# Patient Record
Sex: MALE | Race: BLACK OR AFRICAN AMERICAN | Employment: UNEMPLOYED | URBAN - METROPOLITAN AREA
[De-identification: names, ages, dates, MRNs, and addresses within clinical notes are randomized per-mention and may not be internally consistent; named-entity substitution may affect disease eponyms.]

---

## 2021-05-04 ENCOUNTER — HOSPITAL ENCOUNTER (INPATIENT)
Age: 36
LOS: 7 days | Discharge: HOME | DRG: 751 | End: 2021-05-11
Attending: EMERGENCY MEDICINE | Admitting: PSYCHIATRY & NEUROLOGY
Payer: MEDICAID

## 2021-05-04 DIAGNOSIS — R45.851 DEPRESSION WITH SUICIDAL IDEATION: Primary | ICD-10-CM

## 2021-05-04 DIAGNOSIS — F32.A DEPRESSION WITH SUICIDAL IDEATION: Primary | ICD-10-CM

## 2021-05-04 DIAGNOSIS — R73.9 HYPERGLYCEMIA: ICD-10-CM

## 2021-05-04 PROBLEM — Z86.59 H/O MAJOR DEPRESSION: Status: ACTIVE | Noted: 2021-05-04

## 2021-05-04 LAB
-: ABNORMAL
ABSOLUTE EOS #: 0 K/UL (ref 0–0.4)
ABSOLUTE IMMATURE GRANULOCYTE: ABNORMAL K/UL (ref 0–0.3)
ABSOLUTE LYMPH #: 2.28 K/UL (ref 1–4.8)
ABSOLUTE MONO #: 0.67 K/UL (ref 0.1–1.3)
ACETAMINOPHEN LEVEL: <5 UG/ML (ref 10–30)
AMORPHOUS: ABNORMAL
AMPHETAMINE SCREEN URINE: NEGATIVE
ANION GAP SERPL CALCULATED.3IONS-SCNC: 10 MMOL/L (ref 9–17)
BACTERIA: ABNORMAL
BARBITURATE SCREEN URINE: NEGATIVE
BASOPHILS # BLD: 0 % (ref 0–2)
BASOPHILS ABSOLUTE: 0 K/UL (ref 0–0.2)
BENZODIAZEPINE SCREEN, URINE: NEGATIVE
BILIRUBIN URINE: NEGATIVE
BUN BLDV-MCNC: 10 MG/DL (ref 6–20)
BUN/CREAT BLD: ABNORMAL (ref 9–20)
BUPRENORPHINE URINE: NORMAL
CALCIUM SERPL-MCNC: 9.6 MG/DL (ref 8.6–10.4)
CANNABINOID SCREEN URINE: NEGATIVE
CASTS UA: ABNORMAL /LPF
CHLORIDE BLD-SCNC: 98 MMOL/L (ref 98–107)
CO2: 25 MMOL/L (ref 20–31)
COCAINE METABOLITE, URINE: NEGATIVE
COLOR: YELLOW
COMMENT UA: ABNORMAL
CREAT SERPL-MCNC: 0.74 MG/DL (ref 0.7–1.2)
CRYSTALS, UA: ABNORMAL /HPF
DIFFERENTIAL TYPE: ABNORMAL
EOSINOPHILS RELATIVE PERCENT: 0 % (ref 0–4)
EPITHELIAL CELLS UA: ABNORMAL /HPF
ETHANOL PERCENT: <0.01 %
ETHANOL: <10 MG/DL
GFR AFRICAN AMERICAN: >60 ML/MIN
GFR NON-AFRICAN AMERICAN: >60 ML/MIN
GFR SERPL CREATININE-BSD FRML MDRD: ABNORMAL ML/MIN/{1.73_M2}
GFR SERPL CREATININE-BSD FRML MDRD: ABNORMAL ML/MIN/{1.73_M2}
GLUCOSE BLD-MCNC: 243 MG/DL (ref 75–110)
GLUCOSE BLD-MCNC: 260 MG/DL (ref 75–110)
GLUCOSE BLD-MCNC: 301 MG/DL (ref 70–99)
GLUCOSE BLD-MCNC: 306 MG/DL (ref 75–110)
GLUCOSE BLD-MCNC: 344 MG/DL (ref 75–110)
GLUCOSE URINE: ABNORMAL
HCT VFR BLD CALC: 44.8 % (ref 41–53)
HEMOGLOBIN: 15.1 G/DL (ref 13.5–17.5)
IMMATURE GRANULOCYTES: ABNORMAL %
KETONES, URINE: ABNORMAL
LEUKOCYTE ESTERASE, URINE: NEGATIVE
LYMPHOCYTES # BLD: 24 % (ref 24–44)
MCH RBC QN AUTO: 30.9 PG (ref 26–34)
MCHC RBC AUTO-ENTMCNC: 33.7 G/DL (ref 31–37)
MCV RBC AUTO: 91.7 FL (ref 80–100)
MDMA URINE: NORMAL
METHADONE SCREEN, URINE: NEGATIVE
METHAMPHETAMINE, URINE: NORMAL
MONOCYTES # BLD: 7 % (ref 1–7)
MORPHOLOGY: ABNORMAL
MUCUS: ABNORMAL
NITRITE, URINE: NEGATIVE
NRBC AUTOMATED: ABNORMAL PER 100 WBC
OPIATES, URINE: NEGATIVE
OTHER OBSERVATIONS UA: ABNORMAL
OXYCODONE SCREEN URINE: NEGATIVE
PDW BLD-RTO: 13.3 % (ref 11.5–14.9)
PH UA: 6.5 (ref 5–8)
PHENCYCLIDINE, URINE: NEGATIVE
PLATELET # BLD: 199 K/UL (ref 150–450)
PLATELET ESTIMATE: ABNORMAL
PMV BLD AUTO: 10.1 FL (ref 6–12)
POTASSIUM SERPL-SCNC: 4.3 MMOL/L (ref 3.7–5.3)
PROPOXYPHENE, URINE: NORMAL
PROTEIN UA: NEGATIVE
RBC # BLD: 4.88 M/UL (ref 4.5–5.9)
RBC # BLD: ABNORMAL 10*6/UL
RBC UA: ABNORMAL /HPF
RENAL EPITHELIAL, UA: ABNORMAL /HPF
SALICYLATE LEVEL: <1 MG/DL (ref 3–10)
SARS-COV-2, RAPID: NOT DETECTED
SEG NEUTROPHILS: 69 % (ref 36–66)
SEGMENTED NEUTROPHILS ABSOLUTE COUNT: 6.55 K/UL (ref 1.3–9.1)
SODIUM BLD-SCNC: 133 MMOL/L (ref 135–144)
SPECIFIC GRAVITY UA: 1.04 (ref 1–1.03)
SPECIMEN DESCRIPTION: NORMAL
TEST INFORMATION: NORMAL
TOXIC TRICYCLIC SC,BLOOD: ABNORMAL
TRICHOMONAS: ABNORMAL
TRICYCLIC ANTIDEP,URINE: NEGATIVE
TRICYCLIC ANTIDEPRESSANTS, UR: NORMAL
TURBIDITY: CLEAR
URINE HGB: ABNORMAL
UROBILINOGEN, URINE: NORMAL
WBC # BLD: 9.5 K/UL (ref 3.5–11)
WBC # BLD: ABNORMAL 10*3/UL
WBC UA: ABNORMAL /HPF
YEAST: ABNORMAL

## 2021-05-04 PROCEDURE — 81001 URINALYSIS AUTO W/SCOPE: CPT

## 2021-05-04 PROCEDURE — 83036 HEMOGLOBIN GLYCOSYLATED A1C: CPT

## 2021-05-04 PROCEDURE — 87635 SARS-COV-2 COVID-19 AMP PRB: CPT

## 2021-05-04 PROCEDURE — 82947 ASSAY GLUCOSE BLOOD QUANT: CPT

## 2021-05-04 PROCEDURE — G0480 DRUG TEST DEF 1-7 CLASSES: HCPCS

## 2021-05-04 PROCEDURE — 36415 COLL VENOUS BLD VENIPUNCTURE: CPT

## 2021-05-04 PROCEDURE — 6370000000 HC RX 637 (ALT 250 FOR IP): Performed by: EMERGENCY MEDICINE

## 2021-05-04 PROCEDURE — 85025 COMPLETE CBC W/AUTO DIFF WBC: CPT

## 2021-05-04 PROCEDURE — 80048 BASIC METABOLIC PNL TOTAL CA: CPT

## 2021-05-04 PROCEDURE — 80143 DRUG ASSAY ACETAMINOPHEN: CPT

## 2021-05-04 PROCEDURE — 6370000000 HC RX 637 (ALT 250 FOR IP): Performed by: PSYCHIATRY & NEUROLOGY

## 2021-05-04 PROCEDURE — 99285 EMERGENCY DEPT VISIT HI MDM: CPT

## 2021-05-04 PROCEDURE — 80307 DRUG TEST PRSMV CHEM ANLYZR: CPT

## 2021-05-04 PROCEDURE — 80179 DRUG ASSAY SALICYLATE: CPT

## 2021-05-04 PROCEDURE — 1240000000 HC EMOTIONAL WELLNESS R&B

## 2021-05-04 PROCEDURE — 6370000000 HC RX 637 (ALT 250 FOR IP): Performed by: NURSE PRACTITIONER

## 2021-05-04 RX ORDER — PALIPERIDONE 6 MG/1
6 TABLET, EXTENDED RELEASE ORAL 2 TIMES DAILY
Status: ON HOLD | COMMUNITY
End: 2021-05-11 | Stop reason: HOSPADM

## 2021-05-04 RX ORDER — LORAZEPAM 1 MG/1
1 TABLET ORAL ONCE
Status: COMPLETED | OUTPATIENT
Start: 2021-05-04 | End: 2021-05-04

## 2021-05-04 RX ORDER — POLYETHYLENE GLYCOL 3350 17 G/17G
17 POWDER, FOR SOLUTION ORAL DAILY PRN
Status: DISCONTINUED | OUTPATIENT
Start: 2021-05-04 | End: 2021-05-11 | Stop reason: HOSPADM

## 2021-05-04 RX ORDER — PREGABALIN 100 MG/1
100 CAPSULE ORAL 3 TIMES DAILY
Status: ON HOLD | COMMUNITY
End: 2021-05-11 | Stop reason: HOSPADM

## 2021-05-04 RX ORDER — INSULIN GLARGINE 100 [IU]/ML
55 INJECTION, SOLUTION SUBCUTANEOUS NIGHTLY
Status: DISCONTINUED | OUTPATIENT
Start: 2021-05-04 | End: 2021-05-05

## 2021-05-04 RX ORDER — INSULIN LISPRO 100 [IU]/ML
40 INJECTION, SOLUTION INTRAVENOUS; SUBCUTANEOUS
COMMUNITY

## 2021-05-04 RX ORDER — QUETIAPINE FUMARATE 200 MG/1
200 TABLET, FILM COATED ORAL NIGHTLY
Status: ON HOLD | COMMUNITY
End: 2021-05-11 | Stop reason: HOSPADM

## 2021-05-04 RX ORDER — NICOTINE 21 MG/24HR
1 PATCH, TRANSDERMAL 24 HOURS TRANSDERMAL DAILY
Status: DISCONTINUED | OUTPATIENT
Start: 2021-05-04 | End: 2021-05-11 | Stop reason: HOSPADM

## 2021-05-04 RX ORDER — DEXTROSE MONOHYDRATE 25 G/50ML
12.5 INJECTION, SOLUTION INTRAVENOUS PRN
Status: DISCONTINUED | OUTPATIENT
Start: 2021-05-04 | End: 2021-05-11 | Stop reason: HOSPADM

## 2021-05-04 RX ORDER — DEXTROSE MONOHYDRATE 50 MG/ML
100 INJECTION, SOLUTION INTRAVENOUS PRN
Status: DISCONTINUED | OUTPATIENT
Start: 2021-05-04 | End: 2021-05-11 | Stop reason: HOSPADM

## 2021-05-04 RX ORDER — MAGNESIUM HYDROXIDE/ALUMINUM HYDROXICE/SIMETHICONE 120; 1200; 1200 MG/30ML; MG/30ML; MG/30ML
30 SUSPENSION ORAL EVERY 6 HOURS PRN
Status: DISCONTINUED | OUTPATIENT
Start: 2021-05-04 | End: 2021-05-11 | Stop reason: HOSPADM

## 2021-05-04 RX ORDER — NICOTINE POLACRILEX 4 MG
15 LOZENGE BUCCAL PRN
Status: DISCONTINUED | OUTPATIENT
Start: 2021-05-04 | End: 2021-05-11 | Stop reason: HOSPADM

## 2021-05-04 RX ORDER — HYDROXYZINE PAMOATE 50 MG/1
50 CAPSULE ORAL 3 TIMES DAILY PRN
Status: ON HOLD | COMMUNITY
End: 2021-05-11 | Stop reason: HOSPADM

## 2021-05-04 RX ORDER — TRAZODONE HYDROCHLORIDE 50 MG/1
50 TABLET ORAL NIGHTLY PRN
Status: DISCONTINUED | OUTPATIENT
Start: 2021-05-04 | End: 2021-05-06

## 2021-05-04 RX ORDER — INSULIN GLARGINE 100 [IU]/ML
55 INJECTION, SOLUTION SUBCUTANEOUS NIGHTLY
COMMUNITY

## 2021-05-04 RX ORDER — IBUPROFEN 400 MG/1
400 TABLET ORAL EVERY 6 HOURS PRN
Status: DISCONTINUED | OUTPATIENT
Start: 2021-05-04 | End: 2021-05-11 | Stop reason: HOSPADM

## 2021-05-04 RX ORDER — HYDROXYZINE 50 MG/1
50 TABLET, FILM COATED ORAL 3 TIMES DAILY PRN
Status: DISCONTINUED | OUTPATIENT
Start: 2021-05-04 | End: 2021-05-11 | Stop reason: HOSPADM

## 2021-05-04 RX ORDER — ACETAMINOPHEN 325 MG/1
650 TABLET ORAL EVERY 4 HOURS PRN
Status: DISCONTINUED | OUTPATIENT
Start: 2021-05-04 | End: 2021-05-11 | Stop reason: HOSPADM

## 2021-05-04 RX ADMIN — INSULIN GLARGINE 55 UNITS: 100 INJECTION, SOLUTION SUBCUTANEOUS at 20:54

## 2021-05-04 RX ADMIN — INSULIN LISPRO 4 UNITS: 100 INJECTION, SOLUTION INTRAVENOUS; SUBCUTANEOUS at 20:55

## 2021-05-04 RX ADMIN — LORAZEPAM 1 MG: 1 TABLET ORAL at 15:40

## 2021-05-04 ASSESSMENT — SLEEP AND FATIGUE QUESTIONNAIRES
AVERAGE NUMBER OF SLEEP HOURS: 2
RESTFUL SLEEP: NO
DIFFICULTY ARISING: NO
DIFFICULTY STAYING ASLEEP: YES
SLEEP PATTERN: DIFFICULTY FALLING ASLEEP;RESTLESSNESS;DIFFICULTY ARISING;DISTURBED/INTERRUPTED SLEEP
DO YOU HAVE DIFFICULTY SLEEPING: YES
DIFFICULTY FALLING ASLEEP: YES

## 2021-05-04 NOTE — PROGRESS NOTES
Medication History completed: All medications added this encounter. Medications confirmed with [facility] and [address] [place] [address]. The patient states he has been off all medications for about a week. He states he used Adderall without a prescription about a week ago.      Thank you,  Rosalva Alexandra, PharmD, BCPS  [phone]

## 2021-05-04 NOTE — ED NOTES
This note will not be viewable in MyChart for the following reason(s). This is a Psychotherapy Note. Provisional Diagnosis:   Schizoaffective Disorder     Psychosocial and Contextual Factors: [facility] patient was observed attempting to walk in front of traffic. C-SSRS Summary:      Patient:X   Family:   Agency:     Substance Abuse: Patient denies. Present Suicidal Behavior:  Patient was reportedly attempting to run in front of traffic prior to arrival. Patient reports current suicidal thoughts ith plan to run in front of traffic. Verbal: X    Attempt:X    Past Suicidal Behavior: Patient reports a past suicide attempt \"a couple of years ago. \" but does not remember how. Verbal:    Attempt:X      Self-Injurious/Self-Mutilation:Patient denies. Trauma Identified:  Patient denies. Risk Factors:    Patient reports no support system. Patient is homeless. Patient has no income. Clinical Summary:    Geraldine Babin is a 38 y.o. male who presents to the ED by [facility] department after patient abandoned his car and attempting to walk in front of traffic. Patient reports auditory command hallucinations telling him to kill and hurt himself. Patient reports suicidal ideation for the last month and states the thoughts have been getting worse in frequency and during. Patient reports current suicidal ideation with plan and intent to run in front of traffic. Patient states his auditory hallucinations are the main reason behind feeling depressed and suicidal.        Patient is withdrawn while in the ED. Patient states he is homeless and has no support system. Patient states he is not linked with outpatient mental health services but states he ran out of his psychotropic medications about a week ago. Patient reports poor sleep. Patient denies visual hallucinations. Patient states he has been admitted psychiatrically multiple times before.  Patient denies homicidal ideation. Level of Care Disposition:    Writer consulted with Dr. Farhana Maza who recommends inpatient psychiatric admission for safety and stabilization. Patient is in agreement and signed application for voluntary admission.

## 2021-05-04 NOTE — ED NOTES
Writer got off the phone with [facility] for "[employer] in [place]. Staff states they can not move further with the case until a [place] petition to [place] [address] is completed. Patient ED physician is not licensed in [place] and can not complete petition to [place] [address]. Writer to call [facility] for admission to [facility].

## 2021-05-04 NOTE — BH NOTE
.Patient given tobacco quitline number 16673471598 at this time, refusing to call at this time, states \" I just dont want to quit now\"- patient given information as to the dangers of long term tobacco use. Continue to reinforce the importance of tobacco cessation.

## 2021-05-04 NOTE — ED NOTES
Duplicate, see previous TE. Writer spoke with the [facility] psychiatry who states patient they can't admit patient as he patient needs to be authorized through the [facility] he is from. Writer spoke with [facility] ([phone]) Who states patient has not received services in over 3 years. SARAH states they need a packet faxed to [phone]. Writer faxed [facility] patient information, Patient does not have ID.

## 2021-05-04 NOTE — BH NOTE
`[facility] [place]  Admission Note     Admission Type:   Admission Type: Voluntary    Reason for admission:  Reason for Admission: depression with suicidal ideation    PATIENT STRENGTHS:  Strengths: No significant Physical Illness    Patient Strengths and Limitations:  Limitations: Hopeless about future    Addictive Behavior:   Addictive Behavior  In the past 3 months, have you felt or has someone told you that you have a problem with:  : None  Do you have a history of Chemical Use?: No  Do you have a history of Alcohol Use?: No  Do you have a history of Street Drug Abuse?: No  Histroy of Prescripton Drug Abuse?: No    Medical Problems:   Past Medical History:   Diagnosis Date    [facility] ([facility]     [facility]        Status EXAM:  Status and Exam  Normal: No  Facial Expression: Avoids Gaze, Flat  Affect: Blunt  Level of Consciousness: Alert  Mood:Normal: No  Mood: Depressed, Anxious  Motor Activity:Normal: No  Motor Activity: Decreased  Interview Behavior: Evasive  Preception: [place] to Person, [place] to Time, [place] to Place, [place] to Situation  Attention:Normal: No  Attention: Unable to Concentrate  Thought Processes: Blocking  Thought Content:Normal: No  Thought Content: Preoccupations  Hallucinations:  Auditory (Comment)(\"not nice things\")  Delusions: No  Memory:Normal: No  Memory: Poor Recent, Poor Remote  Insight and Judgment: No  Insight and Judgment: Poor Judgment, Poor Insight, Unmotivated  Present Suicidal Ideation: No  Present Homicidal Ideation: No    Tobacco Screening:  Practical Counseling, on admission, andie X, if applicable and completed (first 3 are required if patient doesn't refuse):            ( )  Recognizing danger situations (included triggers and roadblocks)                    ( )  Coping skills (new ways to manage stress, exercise, relaxation techniques, changing routine, distraction)                                                           ( )  Basic information about quitting (benefits of quitting, techniques in how to quit, available resources  ( ) Referral for counseling faxed to [facility]                                           (x ) Patient refused counseling  ( ) Patient has not smoked in the last 30 days    Metabolic Screening:    No results found for: LABA1C    No results found for: CHOL  No results found for: TRIG  No results found for: HDL  No components found for: LDLCAL  No results found for: LABVLDL      Body mass index is 31 kg/m². BP Readings from Last 2 Encounters:   05/04/21 128/74           Pt admitted with followings belongings:  Lucrecia Bullion: [employer]  Clothing: Footwear, Pants, Shirt, Socks     Valuables sent home with N/A. Valuables placed in safe in security envelope, number:  [id number]. Patient's home medications were reviewed. Patient oriented to surroundings and program expectations and copy of patient rights given. Received admission packet:  yes. Consents reviewed, signed voluntary Refused all other consents. Patient verbalize understanding:  yes. Patient education on precautions: yes    Patient admitted to the unit voluntary from the [facility] brought in by [facility] after abandoning car on highway and walking into traffic. Patient is homeless and off medications for more than one week. Patient states he is hearing voices saying \"not nice things. \"  On admission to the unit patient states he is tired and wants to go sleep. Declined signing admission papers. Patient denies drug and alcohol use. Reports poor sleep.                    Blanka Dobbins RN

## 2021-05-04 NOTE — ED PROVIDER NOTES
[address] ED  eMERGENCY dEPARTMENT eNCOUnter      Pt Name: Camila Brewer  MRN: [id number]  YOB: 1985  Date of evaluation: 5/4/21  PCP: No primary care provider on file. CHIEF COMPLAINT:   Chief Complaint   Patient presents with    Suicidal     HISTORY OF PRESENT ILLNESS    Camila Brweer is a 38 y.o. male who presents with a chief complaint of suicidal ideations. Patient states that he is hearing voices over the past week. They are telling him to hurt himself. He apparently was found by police running into traffic today. States he was not hit or anything. He denies any thoughts of hurting anyone else. He states that he has not been taking any of his medications including his diabetic medications over the past week. No recent fevers, chills other illnesses. No nausea or vomiting or changes in bowel or bladder habits. He is supposed to be on insulin for his diabetes. Symptoms are acute. Symptoms are moderate. Nothing make symptoms better or worse. Patient has no other complaints at this time. REVIEW OF SYSTEMS       Constitutional: Denies recent fever, chills. Neck: No neck pain. Respiratory: Denies recent shortness of breath. Cardiac:  Denies recent chest pain. GI: Denies any recent abdominal pain nausea or vomiting. : Denies dysuria. Musculoskeletal: Denies focal weakness. Neurologic: Denies headache or focal weakness. Skin:  Denies any rash. Psychiatric: Positive for auditory hallucinations, suicidal ideations    Negative in 10 essential Systems except as mentioned above and in the HPI. PAST MEDICAL HISTORY   PMH:  has a past medical history of Diabetes mellitus ([facility] and Schizophrenia ([facility] Diabetes  Surgical History:  has no past surgical history on file. Noncontributory  Social History:  reports that he has never smoked. He does not have any smokeless tobacco history on file. He reports previous alcohol use.  He reports previous drug use.Denies drug or alcohol use  Family History: Noncontributory at this time  Psychiatric History: See PMH  Allergies:has No Known Allergies. PHYSICAL EXAM     INITIAL VITALS: BP: (!) 117/56  Pulse: 68  Resp: 16  Temp: 97.9 °F (36.6 °C) SpO2: 98 %     Constitutional:  Well developed, no acute distress   Eyes:  Pupils equal and readily reactive to light  HENT:  Atraumatic, external ears normal, nose normal, oropharynx moist. Neck- supple    Respiratory:  Clear to auscultation bilaterally with good air exchange  Cardiovascular:  RRR with normal S1 and S2  GI:  Soft, nondistended and nontender   Musculoskeletal:  No edema, no tenderness, no deformities  Integument:  No rash  Neurologic:  Alert & oriented x 4, no focal deficits noted   Psychiatric: Flat affect      EMERGENCY DEPARTMENT COURSE     25-year-old male presents with suicidal ideations and attempting to run into traffic. Currently is afebrile, nontoxic, normal vital signs. No acute distress. Blood sugar over 300 per EMS. He is a known diabetic noncompliant with his insulin. We will get blood work here, speak with on-call psychiatry. Patient has never been [place] and does acknowledge a psychiatric history. Patient is medically clear for psychiatric evaluation and admission. FINAL IMPRESSION     1. Depression with suicidal ideation    2. Hyperglycemia          DISPOSITION:  DISPOSITION          PATIENT REFERRED TO:  No follow-up provider specified. DISCHARGE MEDICATIONS:  New Prescriptions    No medications on file       (Please note that portions of this note were completed with a voice recognition program. Efforts were made to edit the dictations but occasionally words are mis-transcribed.  Whenever words are used in this note in any gender, they shall be construed as though they were used in the gender appropriate to the circumstances; and whenever words are used in this note in the singular or plural form, they shall be construed as though they were used in the form appropriate to the circumstances.)    Lani Ruiz DO  Attending Emergency Medicine Physician        Lani Ruiz DO  05/04/21 [id number]

## 2021-05-05 ENCOUNTER — APPOINTMENT (OUTPATIENT)
Dept: ULTRASOUND IMAGING | Age: 36
DRG: 751 | End: 2021-05-05
Payer: MEDICAID

## 2021-05-05 PROBLEM — F33.3 MDD (MAJOR DEPRESSIVE DISORDER), RECURRENT, SEVERE, WITH PSYCHOSIS (HCC): Status: ACTIVE | Noted: 2021-05-05

## 2021-05-05 PROBLEM — F20.0 PARANOID SCHIZOPHRENIA (HCC): Status: ACTIVE | Noted: 2021-05-05

## 2021-05-05 LAB
ESTIMATED AVERAGE GLUCOSE: 269 MG/DL
GLUCOSE BLD-MCNC: 199 MG/DL (ref 75–110)
GLUCOSE BLD-MCNC: 267 MG/DL (ref 75–110)
GLUCOSE BLD-MCNC: 267 MG/DL (ref 75–110)
GLUCOSE BLD-MCNC: 273 MG/DL (ref 75–110)
HBA1C MFR BLD: 11 % (ref 4–6)

## 2021-05-05 PROCEDURE — 6370000000 HC RX 637 (ALT 250 FOR IP): Performed by: NURSE PRACTITIONER

## 2021-05-05 PROCEDURE — 1240000000 HC EMOTIONAL WELLNESS R&B

## 2021-05-05 PROCEDURE — 6370000000 HC RX 637 (ALT 250 FOR IP): Performed by: PSYCHIATRY & NEUROLOGY

## 2021-05-05 PROCEDURE — 90792 PSYCH DIAG EVAL W/MED SRVCS: CPT | Performed by: PSYCHIATRY & NEUROLOGY

## 2021-05-05 PROCEDURE — 6370000000 HC RX 637 (ALT 250 FOR IP): Performed by: INTERNAL MEDICINE

## 2021-05-05 PROCEDURE — 99222 1ST HOSP IP/OBS MODERATE 55: CPT | Performed by: INTERNAL MEDICINE

## 2021-05-05 PROCEDURE — 76770 US EXAM ABDO BACK WALL COMP: CPT

## 2021-05-05 PROCEDURE — 82947 ASSAY GLUCOSE BLOOD QUANT: CPT

## 2021-05-05 RX ORDER — OLANZAPINE 5 MG/1
5 TABLET ORAL NIGHTLY
Status: DISCONTINUED | OUTPATIENT
Start: 2021-05-05 | End: 2021-05-07

## 2021-05-05 RX ORDER — INSULIN GLARGINE 100 [IU]/ML
25 INJECTION, SOLUTION SUBCUTANEOUS 2 TIMES DAILY
Status: DISCONTINUED | OUTPATIENT
Start: 2021-05-05 | End: 2021-05-06

## 2021-05-05 RX ADMIN — INSULIN GLARGINE 25 UNITS: 100 INJECTION, SOLUTION SUBCUTANEOUS at 09:34

## 2021-05-05 RX ADMIN — INSULIN LISPRO 6 UNITS: 100 INJECTION, SOLUTION INTRAVENOUS; SUBCUTANEOUS at 09:31

## 2021-05-05 RX ADMIN — HYDROXYZINE HYDROCHLORIDE 50 MG: 50 TABLET, FILM COATED ORAL at 20:25

## 2021-05-05 RX ADMIN — INSULIN LISPRO 2 UNITS: 100 INJECTION, SOLUTION INTRAVENOUS; SUBCUTANEOUS at 13:08

## 2021-05-05 RX ADMIN — INSULIN LISPRO 3 UNITS: 100 INJECTION, SOLUTION INTRAVENOUS; SUBCUTANEOUS at 20:24

## 2021-05-05 RX ADMIN — TRAZODONE HYDROCHLORIDE 50 MG: 50 TABLET ORAL at 20:25

## 2021-05-05 RX ADMIN — INSULIN LISPRO 6 UNITS: 100 INJECTION, SOLUTION INTRAVENOUS; SUBCUTANEOUS at 17:57

## 2021-05-05 RX ADMIN — INSULIN GLARGINE 25 UNITS: 100 INJECTION, SOLUTION SUBCUTANEOUS at 20:24

## 2021-05-05 RX ADMIN — OLANZAPINE 5 MG: 5 TABLET, FILM COATED ORAL at 20:25

## 2021-05-05 NOTE — CARE COORDINATION
BHI Biopsychosocial Assessment    Current Level of Psychosocial Functioning     Independent: xx  Dependent:    Minimal Assist:      Psychosocial High Risk Factors (check all that apply)    Unable to obtain meds:    Chronic illness/pain:     Substance abuse: xx  Lack of Family Support:    Financial stress:    Isolation:    Inadequate Community Resources:    Suicide attempt(s):    Not taking medications:     Victim of crime:    Developmental Delay:    Unable to manage personal needs:    Age 72 or older:    Homeless:    No transportation:    Readmission within 30 days:    Unemployment:    Traumatic Event:      Psychiatric Advanced Directives: None reported    Family to Involve in Treatment: Patient states he does not wish to involve family in treatment    Sexual Orientation: DAMIÁN    Patient Strengths: Communication skills, no significant physical illnesses    Patient Barriers: substance use, lack of supports    Opiate Education: patient denies substance use    CMHC/mental health history: Patient reports he was linked to [facility] in [facility] but stopped going and stopped medications, patient reports having a diagnosis of schizophrenia    Plan of Care   medication management, group/individual therapies, family meetings, psycho -education, treatment team meetings to assist with stabilization    Initial Discharge Plan: Patient to stabilize mood, return to [facility] for outpatient services      Clinical Summary:      Pt is a 38year old male who presented to the [facility] with reported suicidal ideations. Patient reports he lives near [facility], got in his car to \"drive until I ran out of gas and kill myself. \" Patient reports he needs to get back on medications, states he was seeing a doctor in [facility] but stopped going to appointments and stopped medications. Patient is unsure of the name of the agency. Patient reports he was living with his girlfriend, kids, and sister but states he cannot return there at discharge.  Patient declined to offer more information on his relationship issues. Patient reports previous inpatient hospitalizations. Patient reports auditory hallucinations are quieter today than they have been recently, denies visual hallucinations. Patient reports fleeting suicidal ideations but contracts for safety on unit. Social work will continue to engage patient in treatment and discharge planning.

## 2021-05-05 NOTE — GROUP NOTE
Group Therapy Note    Date: 5/5/2021    Group Start Time: 1430  Group End Time: 1204  Group Topic: Cognitive Skills    [facility]    [place], [place]        Group Therapy Note    Attendees: 8/14         Pt did not attend RT skills group d/t resting in room despite staff invitation to attend. 1:1 talk time offered as alternative to group session, pt declined.

## 2021-05-05 NOTE — CONSULTS
pen Inject 55 Units into the skin nightly   Yes Historical Provider, MD   pregabalin (LYRICA) 100 MG capsule Take 100 mg by mouth 3 times daily. Yes Historical Provider, MD        Allergies:     Patient has no known allergies. Social History:     Tobacco:    reports that he has never smoked. He does not have any smokeless tobacco history on file. Alcohol:      reports previous alcohol use. Drug Use:  reports previous drug use. Family History:     History reviewed. No pertinent family history. Review of Systems:     Positive and Negative as described in HPI. CONSTITUTIONAL:  negative for fevers, chills, sweats, fatigue, weight loss  HEENT:  negative for vision, hearing changes, runny nose, throat pain  RESPIRATORY:  negative for shortness of breath, cough, congestion, wheezing. CARDIOVASCULAR:  negative for chest pain, palpitations.   GASTROINTESTINAL:  negative for nausea, vomiting, diarrhea, constipation, change in bowel habits, abdominal pain   GENITOURINARY:  negative for difficulty of urination, burning with urination, frequency   INTEGUMENT:  negative for rash, skin lesions, easy bruising   HEMATOLOGIC/LYMPHATIC:  negative for swelling/edema   ALLERGIC/IMMUNOLOGIC:  negative for urticaria , itching  ENDOCRINE:  negative increase in drinking, increase in urination, hot or cold intolerance  MUSCULOSKELETAL:  negative joint pains, muscle aches, swelling of joints  NEUROLOGICAL:  negative for headaches, dizziness, lightheadedness, numbness, pain, tingling extremities      Physical Exam:     BP (!) 108/53   Pulse 82   Temp 98.2 °F (36.8 °C) (Oral)   Resp 14   Ht 6' 1\" (1.854 m)   Wt 235 lb (106.6 kg)   SpO2 98%   BMI 31.00 kg/m²   Temp (24hrs), Av.2 °F (36.8 °C), Min:97.9 °F (36.6 °C), Max:98.7 °F (37.1 °C)    Recent Labs     21  1655 21  1824 21  0912   POCGLU 243* 344* 306* 267*     No intake or output data in the 24 hours ending 21 1026    General Appearance:  alert, well appearing, and in no acute distress  Mental status: oriented to person, place, and time with normal affect  Head:  normocephalic, atraumatic. Eye: no icterus, redness, pupils equal and reactive, extraocular eye movements intact, conjunctiva clear  Ear: normal external ear, no discharge, hearing intact  Nose:  no drainage noted  Mouth: mucous membranes moist  Neck: supple, no carotid bruits, thyroid not palpable  Lungs: Bilateral equal air entry, clear to ausculation, no wheezing, rales or rhonchi, normal effort  Cardiovascular: normal rate, regular rhythm, no murmur, gallop, rub.   Abdomen: Soft, nontender, nondistended, normal bowel sounds, no hepatomegaly or splenomegaly  Neurologic: There are no new focal motor or sensory deficits, normal muscle tone and bulk, no abnormal sensation, normal speech, cranial nerves II through XII grossly intact  Skin: No gross lesions, rashes, bruising or bleeding on exposed skin area  Extremities:  peripheral pulses palpable, no pedal edema or calf pain with palpation      Investigations:      Laboratory Testing:  Recent Results (from the past 24 hour(s))   CBC Auto Differential    Collection Time: 05/04/21  1:40 PM   Result Value Ref Range    WBC 9.5 3.5 - 11.0 k/uL    RBC 4.88 4.5 - 5.9 m/uL    Hemoglobin 15.1 13.5 - 17.5 g/dL    Hematocrit 44.8 41 - 53 %    MCV 91.7 80 - 100 fL    MCH 30.9 26 - 34 pg    MCHC 33.7 31 - 37 g/dL    RDW 13.3 11.5 - 14.9 %    Platelets 404 944 - 168 k/uL    MPV 10.1 6.0 - 12.0 fL    NRBC Automated NOT REPORTED per 100 WBC    Differential Type NOT REPORTED     Immature Granulocytes NOT REPORTED 0 %    Absolute Immature Granulocyte NOT REPORTED 0.00 - 0.30 k/uL    WBC Morphology NOT REPORTED     RBC Morphology NOT REPORTED     Platelet Estimate NOT REPORTED     Seg Neutrophils 69 (H) 36 - 66 %    Lymphocytes 24 24 - 44 %    Monocytes 7 1 - 7 %    Eosinophils % 0 0 - 4 %    Basophils 0 0 - 2 %    Segs Absolute 6.55 1.3 - 9.1 k/uL    Absolute Lymph # 2.28 1.0 - 4.8 k/uL    Absolute Mono # 0.67 0.1 - 1.3 k/uL    Absolute Eos # 0.00 0.0 - 0.4 k/uL    Basophils Absolute 0.00 0.0 - 0.2 k/uL    Morphology FEW GIANT PLATELETS    Basic Metabolic Panel    Collection Time: 05/04/21  1:40 PM   Result Value Ref Range    Glucose 301 (H) 70 - 99 mg/dL    BUN 10 6 - 20 mg/dL    CREATININE 0.74 0.70 - 1.20 mg/dL    Bun/Cre Ratio NOT REPORTED 9 - 20    Calcium 9.6 8.6 - 10.4 mg/dL    Sodium 133 (L) 135 - 144 mmol/L    Potassium 4.3 3.7 - 5.3 mmol/L    Chloride 98 98 - 107 mmol/L    CO2 25 20 - 31 mmol/L    Anion Gap 10 9 - 17 mmol/L    GFR Non-African American >60 >60 mL/min    GFR African American >60 >60 mL/min    GFR Comment          GFR Staging NOT REPORTED    TOX SCR, BLD, ED    Collection Time: 05/04/21  1:40 PM   Result Value Ref Range    Acetaminophen Level <5 (L) 10 - 30 ug/mL    Ethanol <10 <10 mg/dL    Ethanol percent <2.974 %    Salicylate Lvl <1 (L) 3 - 10 mg/dL    Toxic Tricyclic Sc,Blood SEE UTAD NEGATIVE   COVID-19, Rapid    Collection Time: 05/04/21  1:40 PM    Specimen: Nasopharyngeal Swab   Result Value Ref Range    Specimen Description . NASOPHARYNGEAL SWAB     SARS-CoV-2, Rapid Not Detected Not Detected   POC Glucose Fingerstick    Collection Time: 05/04/21  3:31 PM   Result Value Ref Range    POC Glucose 260 (H) 75 - 110 mg/dL   Urinalysis Reflex to Culture    Collection Time: 05/04/21  3:45 PM    Specimen: Urine, clean catch   Result Value Ref Range    Color, UA YELLOW YELLOW    Turbidity UA CLEAR CLEAR    Glucose, Ur 3+ (A) NEGATIVE    Bilirubin Urine NEGATIVE NEGATIVE    Ketones, Urine MOD (A) NEGATIVE    Specific Gravity, UA 1.043 (H) 1.000 - 1.030    Urine Hgb SMALL (A) NEGATIVE    pH, UA 6.5 5.0 - 8.0    Protein, UA NEGATIVE NEGATIVE    Urobilinogen, Urine Normal Normal    Nitrite, Urine NEGATIVE NEGATIVE    Leukocyte Esterase, Urine NEGATIVE NEGATIVE    Urinalysis Comments NOT REPORTED    Urine Drug Screen    Collection Time: 05/04/21  3:45 PM   Result Value Ref Range    Amphetamine Screen, Ur NEGATIVE NEGATIVE    Barbiturate Screen, Ur NEGATIVE NEGATIVE    Benzodiazepine Screen, Urine NEGATIVE NEGATIVE    Cocaine Metabolite, Urine NEGATIVE NEGATIVE    Methadone Screen, Urine NEGATIVE NEGATIVE    Opiates, Urine NEGATIVE NEGATIVE    Phencyclidine, Urine NEGATIVE NEGATIVE    Propoxyphene, Urine NOT REPORTED NEGATIVE    Cannabinoid Scrn, Ur NEGATIVE NEGATIVE    Oxycodone Screen, Ur NEGATIVE NEGATIVE    Methamphetamine, Urine NOT REPORTED NEGATIVE    Tricyclic Antidepressants, Urine NOT REPORTED NEGATIVE    MDMA, Urine NOT REPORTED NEGATIVE    Buprenorphine Urine NOT REPORTED NEGATIVE    Test Information       Assay provides medical screening only. The absence of expected drug(s) and/or metabolite(s) may indicate diluted or adulterated urine, limitations of testing or timing of collection. Microscopic Urinalysis    Collection Time: 05/04/21  3:45 PM   Result Value Ref Range    -          WBC, UA 0 TO 2 /HPF    RBC, UA 10 TO 20 /HPF    Casts UA NOT REPORTED /LPF    Crystals, UA NOT REPORTED None /HPF    Epithelial Cells UA 2 TO 5 /HPF    Renal Epithelial, UA NOT REPORTED 0 /HPF    Bacteria, UA FEW (A) None    Mucus, UA 1+ (A) None    Trichomonas, UA NOT REPORTED None    Amorphous, UA NOT REPORTED None    Other Observations UA NOT REPORTED NOT REQ.     Yeast, UA NOT REPORTED None   Drug screen, tricyclic    Collection Time: 05/04/21  3:45 PM   Result Value Ref Range    Tricyclic Antidep,Urine NEGATIVE NEGATIVE   POC Glucose Fingerstick    Collection Time: 05/04/21  4:55 PM   Result Value Ref Range    POC Glucose 243 (H) 75 - 110 mg/dL   POC Glucose Fingerstick    Collection Time: 05/04/21  6:24 PM   Result Value Ref Range    POC Glucose 344 (H) 75 - 110 mg/dL   POC Glucose Fingerstick    Collection Time: 05/04/21  8:04 PM   Result Value Ref Range    POC Glucose 306 (H) 75 - 110 mg/dL   POC Glucose Fingerstick    Collection Time: 05/05/21 9:12 AM   Result Value Ref Range    POC Glucose 267 (H) 75 - 110 mg/dL           Consultations:   IP CONSULT TO INTERNAL MEDICINE  Assessment :      Primary Problem  <principal problem not specified>    Active Hospital Problems    Diagnosis Date Noted    H/O major depression [Z86.59] 05/04/2021       Plan:     Uncontrolled diabetes mellitus increase Lantus to 25 twice a day  Microscopic hematuria noted will do urine cultures renal ultrasound to rule out renal cell carcinoma  Obesity class I BMI [facility] Wilson [place], MD  5/5/2021  10:26 AM    Copy sent to Dr. Bonnie Garcia primary care provider on file. Please note that this chart was generated using voice recognition Dragon dictation software. Although every effort was made to ensure the accuracy of this automated transcription, some errors in transcription may have occurred.

## 2021-05-05 NOTE — GROUP NOTE
Group Therapy Note    Date: 5/5/2021    Group Start Time: 1100  Group End Time: 8991  Group Topic: Group Therapy    RANJANA Sidhu, VANESSA        Group Therapy Note  Pt declined to attend psychotherapy at 1100 am despite encouragement. Pt offered 1:1 and refused.     Attendees: 5/9           Signature:  RANJANA Feldman, VANESSA

## 2021-05-05 NOTE — GROUP NOTE
Group Therapy Note    Date: 5/5/2021    Group Start Time: 1330  Group End Time: 0963  Group Topic: Recreational    [address] [facility], [facility]    Patient refused to attend Recreational Therapy Group at 1330 after encouragement from staff. 1:1 talk time offered.     Signature:  Douglas Galindo

## 2021-05-05 NOTE — GROUP NOTE
Group Therapy Note    Date: 5/5/2021    Group Start Time: 1000  Group End Time: 0106  Group Topic: Recreational    [address] [facility], [facility]    Patient refused to attend Recreational Therapy Group at 1000 after encouragement from staff. 1:1 talk time offered.      Signature:  Mayank Kauffman

## 2021-05-05 NOTE — H&P
Department of Psychiatry  Attending Physician Psychiatric Assessment     Reason for Admission to Psychiatric Unit:  Threat to self requiring 24 hour professional observation  Command hallucinations directing harm to self or others; risk of the patient taking action  Concerns about patient's safety in the community    CHIEF COMPLAINT: Command hallucinations telling him to harm himself    History obtained from:  patient, electronic medical record and family members    HISTORY OF PRESENT ILLNESS:    Brendon Lagunas is a 38 y.o. male with significant past medical history of diabetes, hypertension, schizophrenia who presented to the ED with a chief complaint of suicidal ideations. He reports auditory hallucinations that are command in nature telling him to harm himself. The voices do tell him to kill himself. He also reports that the voices cause paranoia, telling him that people are coming to kill him. He reports that the auditory hallucinations are only present when he is depressed, which has been for the past 8 years. He states that they will get better, and then get worse. They have been continuing to get worse since February of this year. He also reports that he occasionally has visual hallucinations, but they are not present today. He states that he sees \"all kinds of things. \"  He reports that he will see both people and shadows. He was found by police running into traffic. He reports that he has been off of all of his medications, both psychiatric and physical health medications. He reports that he has been off of his psychotropic medications for \"a while\" and he sees a psychiatric provider in [place]. He reports depressive symptoms of a low mood for greater than 2 weeks, poor sleep, anhedonia, feelings of guilt and worthlessness, poor energy, poor concentration, loss of appetite, psychomotor retardation, feeling hopeless and helpless, suicidal ideation with plan to run into traffic.   He reported that he is having a manic episode \"right now\" but this is not evident. Manic symptoms include distractibility, irritability, rapid thoughts, needing less sleep. His mood is very poor. He reports auditory and visual hallucinations, with auditory hallucinations being command in nature and present currently. He also reports present paranoid delusions. He reported panic attacks with the most recent episode being \"right now\" but again this is not evident. When panic was explained to the patient he stated that he had an episode today. Panic symptoms include trembling, palpitations, sweating, fear of dying or going crazy, anticipatory anxiety. Generalized anxiety symptoms include excess worry, restlessness, edginess, easily fatigued, poor sleep, poor concentration. He denies any phobias. He denies any eating disorders. He denies any intrusive and persistent thoughts and OCD tendencies. He denied any personality disorder traits. He reported a traumatic event of losing his mother when he was 4years old due to cancer. He stated that he reexperiences the event, has dreams of flashbacks, avoidance behavior, and startles easily.     PSYCHIATRIC HISTORY:  yes -schizophrenia  Currently follows with provider in [place] which he does not recall the name of  Multiple lifetime suicide attempts  Multiple psychiatric hospital admissions    Past psychiatric medications includes: Patient does not recall names of past medications    Adverse reactions from psychotropic medications: yes -\"I almost swallowed my tongue\" but does not recall the name of the medication    Lifetime Psychiatric Review of Systems         Negra or Hypomania: Reports     Panic Attacks: Reports     Phobias: denies     Obsessions and Compulsions:denies     Body or Vocal Tics:  denies     Hallucinations: Reports auditory and visual     Delusions: Reports paranoid paranoid/grandiose/erotomania/persecutory/bizarre/non bizarre/mood congruent/ mood incongruent acute distress  HENT:   Head: Normocephalic and atraumatic. Eyes: Conjunctivae are normal. Right eye exhibits no discharge. Left eye exhibits no discharge. No scleral icterus. Neck: Normal range of motion. Neck supple. Pulmonary/Chest:  No respiratory distress or accessory muscle use, no wheezing. Lungs CTA. Heart rate and rhythm regular. No murmurs rubs or gallops. Nontender. Abdominal: Soft. Exhibits no distension. Bowel sounds active. Nontender. Musculoskeletal: Normal range of motion. Exhibits no edema. Neurological: cranial nerves II-XII grossly in tact, normal gait and station  Skin: Skin is warm and dry. Patient is not diaphoretic. No erythema.          Mental Status Examination:    Level of consciousness:  within normal limits   Appearance:  Hospital attire, lying in bed, fair grooming   Behavior/Motor: no abnormalities noted  Attitude toward examiner:  Cooperative, no eye contact  Speech: normal rate and volume  Mood:  Depressed  Affect:  blunted, flat  Thought processes:   linear, coherent  Thought content: active suicidal ideations without current plan or intent               denies homicidal ideations               Auditory hallucinations              Paranoid delusions  Cognition:  Oriented to self, location, time, situation  Concentration clinically adequate  Memory: intact  Insight &Judgment: poor    DSM-5 Diagnosis  MDD recurrent severe with psychosis      Psychosocial and Contextual factors:  Financial  Occupational  Relationship  Legal   Living situation  Educational     Past Medical History:   Diagnosis Date    [facility] ([facility]     Schizophrenia ([facility]         TREATMENT PLAN    Risk Management:  close watch per standard protocol      Psychotherapy:  participation in milieu and group and individual sessions with Attending Physician,  and Physician Assistant/CNP      Estimated length of stay:  2-14 days      GENERAL PATIENT/FAMILY EDUCATION  Begin Zyprexa 5 mg Patient will understand basic signs and symptoms, Patient will understand benefits/risks and potential side effects from proposed meds and Patient will understand their role in recovery. Family is  active in patient's care. Patient assets that may be helpful during treatment include: Intent to participate and engage in treatment, sufficient fund of knowledge and intellect to understand and utilize treatments. Goals:    Remission of psychosis  Established efficacy and tolerability of medications  Debility of symptoms 2 to 3 days prior to discharge  Participate in group and milieu activities    Behavioral Services  Medicare Certification     Admission Day 1  I certify that this patient's inpatient psychiatric hospital admission is medically necessary for:    x (1) treatment which could reasonably be expected to improve this patient's condition, or    x (2) diagnostic study or its equivalent. Time Spent: 60 minutes     Physicians Signature:  Electronically signed by CRISTINA Snowden CNP on 5/5/21 at 1:57 PM EDT                                         Psychiatry Attending Attestation     I independently saw and evaluated the patient. I reviewed the nurse practitioner's documentation above. Any additional comments or changes to the nurse practitioners documentation are stated below otherwise agree with assessment. Patient is a 49-year-old single male with history of psychosis and depression admitted for worsening suicidal thoughts with commanding auditory hallucinations telling him to harm himself. Patient reports that he has been feeling very distressed about his hallucinations. Reports that he hears multiple voices asking him to kill himself. Reports that he felt that he might act on these thoughts yesterday. Reports having constant feelings of helplessness hopelessness and worthlessness. Identifies stressors as currently being homeless. Reports poor energy and concentration.   Reports having trouble falling asleep and staying asleep. Reports poor appetite. Patient reports he has been off his psychotropic medications for many years now. Reports adequate was making very groggy. Discussed with him about adding Zyprexa at bedtime to help with his hallucinations and mood. He understood and agreed to the plan. Agree with rest of the assessment and plan as above.     Electronically signed by Rob Mendoza MD on 5/5/21 at 5:30 PM EDT

## 2021-05-05 NOTE — PLAN OF CARE
[id number] [address]  Initial Interdisciplinary Treatment Plan NO      Original treatment plan Date & Time: 5/5/2021 0908    Admission Type:  Admission Type: Voluntary    Reason for admission:   Reason for Admission: depression with suicidal ideation    Estimated Length of Stay:  5-7days  Estimated Discharge Date: to be determined by physician    PATIENT STRENGTHS:  Patient Strengths:Strengths: No significant Physical Illness  Patient Strengths and Limitations:Limitations: Hopeless about future  Addictive Behavior: Addictive Behavior  In the past 3 months, have you felt or has someone told you that you have a problem with:  : None  Do you have a history of Chemical Use?: No  Do you have a history of Alcohol Use?: No  Do you have a history of Street Drug Abuse?: No  Histroy of Prescripton Drug Abuse?: No  Medical Problems:  Past Medical History:   Diagnosis Date    Di[facility] ([facility]     Schizophrenia ([facility]      Status EXAM:Status and Exam  Normal: No  Facial Expression: Avoids Gaze, Flat  Affect: Appropriate  Level of Consciousness: Alert  Mood:Normal: No  Mood: Depressed, Anxious  Motor Activity:Normal: Yes  Motor Activity: Decreased  Interview Behavior: Cooperative, Evasive  Preception: [place] to Person, [place] to Time, [place] to Place, [place] to Situation  Attention:Normal: No  Attention: Unable to Concentrate  Thought Processes: Blocking  Thought Content:Normal: No  Thought Content: Preoccupations  Hallucinations: None  Delusions: No  Memory:Normal: No  Memory: Poor Recent, Poor Remote  Insight and Judgment: No  Insight and Judgment: Poor Judgment, Poor Insight  Present Suicidal Ideation: No  Present Homicidal Ideation: No    EDUCATION:   Learner Progress Toward Treatment Goals: reviewed group plans and strategies for care    Method:group therapy, medication compliance, individualized assessments and care planning    Outcome: needs reinforcement    PATIENT GOALS: to be discussed with patient within 72 hours    PLAN/TREATMENT RECOMMENDATIONS:     continue group therapy , medications compliance, goal setting, individualized assessments and care, continue to monitor pt on unit      SHORT-TERM GOALS:   Time frame for Short-Term Goals: 5-7 days    LONG-TERM GOALS:  Time frame for Long-Term Goals: 6 months  Members Present in Team Meeting: See [address]

## 2021-05-05 NOTE — PLAN OF CARE
Problem: Altered Mood, Depressive Behavior:  Goal: Able to verbalize and/or display a decrease in depressive symptoms  Description: Able to verbalize and/or display a decrease in depressive symptoms  5/5/2021 1017 by Charlotte Rodriguez RN  Outcome: Ongoing   1:1 with pt x ten minutes. Pt encouraged to attend unit programming and interact with peers and staff. Pt also encouraged to tend to hygiene and ADLs. Pt encouraged to discuss feelings with staff and feedback and reassurance provided. Pt denies thoughts of self harm and is agreeable to seeking out should thoughts of self harm arise. Safe environment maintained. Q15 minute checks for safety cont per unit policy. Will cont to monitor for safety and provides support and reassurance as needed. Pt seclusive to room for long intervals. Evasive when asked questions. Refused groups.

## 2021-05-05 NOTE — PROGRESS NOTES
Behavioral Services  Medicare Certification Upon Admission    I certify that this patient's inpatient psychiatric hospital admission is medically necessary for:    [x] (1) Treatment which could reasonably be expected to improve this patient's condition,       [x] (2) Or for diagnostic study;     AND     [x](2) The inpatient psychiatric services are provided while the individual is under the care of a physician and are included in the individualized plan of care.     Estimated length of stay/service 3-5 days    Plan for post-hospital care [facility]    Electronically signed by Rolo Saucedo MD on 5/5/2021 at 12:24 PM

## 2021-05-05 NOTE — GROUP NOTE
Group Therapy Note    Date: 5/5/2021    Group Start Time: 0900  Group End Time: 0915  Group Topic: Community Meeting    [facility]    [address] [place] [address]        Group Therapy Note    Attendees: 8/16         Pt did not attend [employer] d/t resting in room despite staff invitation to attend. 1:1 talk time offered as alternative to group session, pt declined.

## 2021-05-06 LAB
GLUCOSE BLD-MCNC: 160 MG/DL (ref 75–110)
GLUCOSE BLD-MCNC: 177 MG/DL (ref 75–110)
GLUCOSE BLD-MCNC: 256 MG/DL (ref 75–110)
GLUCOSE BLD-MCNC: 370 MG/DL (ref 75–110)

## 2021-05-06 PROCEDURE — 6370000000 HC RX 637 (ALT 250 FOR IP): Performed by: NURSE PRACTITIONER

## 2021-05-06 PROCEDURE — 6370000000 HC RX 637 (ALT 250 FOR IP): Performed by: INTERNAL MEDICINE

## 2021-05-06 PROCEDURE — 99232 SBSQ HOSP IP/OBS MODERATE 35: CPT | Performed by: INTERNAL MEDICINE

## 2021-05-06 PROCEDURE — 6370000000 HC RX 637 (ALT 250 FOR IP): Performed by: PSYCHIATRY & NEUROLOGY

## 2021-05-06 PROCEDURE — 99232 SBSQ HOSP IP/OBS MODERATE 35: CPT | Performed by: PSYCHIATRY & NEUROLOGY

## 2021-05-06 PROCEDURE — 82947 ASSAY GLUCOSE BLOOD QUANT: CPT

## 2021-05-06 PROCEDURE — 1240000000 HC EMOTIONAL WELLNESS R&B

## 2021-05-06 RX ORDER — TRAZODONE HYDROCHLORIDE 100 MG/1
100 TABLET ORAL NIGHTLY PRN
Status: DISCONTINUED | OUTPATIENT
Start: 2021-05-06 | End: 2021-05-11 | Stop reason: HOSPADM

## 2021-05-06 RX ORDER — INSULIN GLARGINE 100 [IU]/ML
27 INJECTION, SOLUTION SUBCUTANEOUS 2 TIMES DAILY
Status: DISCONTINUED | OUTPATIENT
Start: 2021-05-06 | End: 2021-05-08

## 2021-05-06 RX ADMIN — INSULIN GLARGINE 25 UNITS: 100 INJECTION, SOLUTION SUBCUTANEOUS at 09:31

## 2021-05-06 RX ADMIN — INSULIN GLARGINE 27 UNITS: 100 INJECTION, SOLUTION SUBCUTANEOUS at 20:32

## 2021-05-06 RX ADMIN — OLANZAPINE 5 MG: 5 TABLET, FILM COATED ORAL at 20:33

## 2021-05-06 RX ADMIN — INSULIN LISPRO 2 UNITS: 100 INJECTION, SOLUTION INTRAVENOUS; SUBCUTANEOUS at 09:31

## 2021-05-06 RX ADMIN — HYDROXYZINE HYDROCHLORIDE 50 MG: 50 TABLET, FILM COATED ORAL at 20:33

## 2021-05-06 RX ADMIN — INSULIN LISPRO 6 UNITS: 100 INJECTION, SOLUTION INTRAVENOUS; SUBCUTANEOUS at 17:26

## 2021-05-06 RX ADMIN — TRAZODONE HYDROCHLORIDE 100 MG: 100 TABLET ORAL at 20:33

## 2021-05-06 RX ADMIN — INSULIN LISPRO 5 UNITS: 100 INJECTION, SOLUTION INTRAVENOUS; SUBCUTANEOUS at 20:31

## 2021-05-06 RX ADMIN — INSULIN LISPRO 2 UNITS: 100 INJECTION, SOLUTION INTRAVENOUS; SUBCUTANEOUS at 11:56

## 2021-05-06 NOTE — PROGRESS NOTES
BEHAVIORAL HEALTH FOLLOW-UP NOTE     5/6/2021     Patient was seen and examined in person, Chart reviewed   Patient's case discussed with staff/team    Chief Complaint: Command hallucinations telling him to harm himself    Interim History: Patient maintains medication compliance and has not required any emergency as needed medications. He did take Atarax for anxiety trazodone for sleep last night around 8:30 PM.  He states his mood is \"depressed. \"  He rates both his depression and anxiety 10 out of 10, with 10 being the highest.  He continues to report active suicidal ideations, no plan while inpatient. He does report he would find any means necessary if he were out of the hospital.  He is able to contract for safety while on the inpatient unit. The patient reports poor sleep. He stated that he did not take his trazodone last night, which is reviewing the chart finds this is not true. We will plan to increase his trazodone tonight. The patient was encouraged to take his trazodone. He states he is attempting to eat, but his appetite is poor. The patient states he does not attend groups and isolates to his room, this is congruent with staff notes. He continues to report auditory hallucinations that are command in nature. He denies visual hallucinations. He denied any further questions or concerns. Appetite:   [x] Normal/Unchanged  [] Increased  [] Decreased      Sleep:       [] Normal/Unchanged  [] Fair       [x] Poor              Energy:    [] Normal/Unchanged  [] Increased  [x] Decreased        Aggression:  [] yes  [x] no    Patient is [x] able  [] unable to CONTRACT FOR SAFETY ON THE UNIT    PAST MEDICAL/PSYCHIATRIC HISTORY:   Past Medical History:   Diagnosis Date    [facility] ([facility]     Schizophrenia ([facility]        FAMILY/SOCIAL HISTORY:  History reviewed. No pertinent family history.   Social History     Socioeconomic History    Marital status: Single     Spouse name: Not on file    Number of children: Not on file    Years of education: Not on file    Highest education level: Not on file   Occupational History    Not on file   Social Needs    Financial resource strain: Not on file    Food insecurity     Worry: Not on file     Inability: Not on file    Transportation needs     Medical: Not on file     Non-medical: Not on file   Tobacco Use    Smoking status: Never Smoker   Substance and Sexual Activity    Alcohol use: Not Currently    Drug use: Not Currently    Sexual activity: Not on file   Lifestyle    Physical activity     Days per week: Not on file     Minutes per session: Not on file    Stress: Not on file   Relationships    Social connections     Talks on phone: Not on file     Gets together: Not on file     Attends Worship service: Not on file     Active member of club or organization: Not on file     Attends meetings of clubs or organizations: Not on file     Relationship status: Not on file    Intimate partner violence     Fear of current or ex partner: Not on file     Emotionally abused: Not on file     Physically abused: Not on file     Forced sexual activity: Not on file   Other Topics Concern    Not on file   Social History Narrative    Not on file           ROS:  [x] All negative/unchanged except if checked.  Explain positive(checked items) below:  [] Constitutional  [] Eyes  [] Ear/Nose/Mouth/Throat  [] Respiratory  [] CV  [] GI  []   [] Musculoskeletal  [] Skin/Breast  [] Neurological  [] Endocrine  [] Heme/Lymph  [] Allergic/Immunologic    Explanation:     MEDICATIONS:    Current Facility-Administered Medications:     insulin glargine (LANTUS) injection vial 27 Units, 27 Units, Subcutaneous, BID, Jose ASHLEIGH Lake MD    OLANZapine (ZYPREXA) tablet 5 mg, 5 mg, Oral, Nightly, CRISTINA Camejo - CNP, 5 mg at 05/05/21 2025    acetaminophen (TYLENOL) tablet 650 mg, 650 mg, Oral, Q4H PRN, Karissa Reina MD    aluminum & magnesium hydroxide-simethicone (MAALOX) 200-200-20 MG/5ML suspension 30 mL, 30 mL, Oral, Q6H PRN, Madiha Albarran MD    hydrOXYzine (ATARAX) tablet 50 mg, 50 mg, Oral, TID PRN, Madiha Albarran MD, 50 mg at 05/05/21 2025    ibuprofen (ADVIL;MOTRIN) tablet 400 mg, 400 mg, Oral, Q6H PRN, Madiha Albarran MD    traZODone (DESYREL) tablet 50 mg, 50 mg, Oral, Nightly PRN, Madiha Albarran MD, 50 mg at 05/05/21 2025    polyethylene glycol (GLYCOLAX) packet 17 g, 17 g, Oral, Daily PRN, Madiha Albarran MD    nicotine polacrilex (NICORETTE) gum 2 mg, 2 mg, Oral, PRN, Madiha Albarran MD    nicotine (NICODERM CQ) 14 MG/24HR 1 patch, 1 patch, Transdermal, Daily, Madiha Albarran MD, 1 patch at 05/06/21 0932    glucose (GLUTOSE) 40 % oral gel 15 g, 15 g, Oral, PRN, Dunia Pedersen, APRN - CNP    dextrose 50 % IV solution, 12.5 g, Intravenous, PRN, Dunia Pedersen, APRN - CNP    glucagon (rDNA) injection 1 mg, 1 mg, Intramuscular, PRN, Dinoimer Nuvia, APRN - CNP    dextrose 5 % solution, 100 mL/hr, Intravenous, PRN, Dinoimer Nuvia, APRN - CNP    insulin lispro (HUMALOG) injection vial 0-12 Units, 0-12 Units, Subcutaneous, TID WC, Dinoimer Nuvia APRN - CNP, 2 Units at 05/06/21 1156    insulin lispro (HUMALOG) injection vial 0-6 Units, 0-6 Units, Subcutaneous, Nightly, Dinoimer Nuvia APRN - CNP, 3 Units at 05/05/21 2024      Examination:  /65   Pulse 74   Temp 97.4 °F (36.3 °C) (Oral)   Resp 14   Ht 6' 1\" (1.854 m)   Wt 235 lb (106.6 kg)   SpO2 98%   BMI 31.00 kg/m²   Gait - steady  Medication side effects(SE): denies    Mental Status Examination:    Level of consciousness:  within normal limits   Appearance:  fair grooming and fair hygiene  Behavior/Motor:  no abnormalities noted  Attitude toward examiner:  cooperative and fair eye contact  Speech:  normal rate and normal volume   Mood: depressed  Affect:  mood congruent  Thought processes:  linear and coherent   Thought content:  Homicidal ideation - none  Suicidal Ideation:  active  Delusions:  no evidence of delusions  Perceptual Disturbance:  auditory  Cognition:  oriented to person, place, and time   Concentration distractible  Insight poor   Judgement poor     ASSESSMENT:   Patient symptoms are:  [] Well controlled  [] Improving  [] Worsening  [x] No change      Diagnosis:   Principal Problem:    MDD (major depressive disorder), recurrent, severe, with psychosis ([facility]  Active Problems:    Paranoid schizophrenia ([facility]    DM type 2, uncontrolled, [facility]  Resolved Problems:    * No resolved hospital problems. *      LABS:    Recent Labs     05/04/21  1340   WBC 9.5   HGB 15.1        Recent Labs     05/04/21  1340   *   K 4.3   CL 98   CO2 25   BUN 10   CREATININE 0.74   GLUCOSE 301*     No results for input(s): BILITOT, ALKPHOS, AST, ALT in the last 72 hours. Lab Results   Component Value Date    BARBSCNU NEGATIVE 05/04/2021    LABBENZ NEGATIVE 05/04/2021    LABMETH NEGATIVE 05/04/2021    PPXUR NOT REPORTED 05/04/2021     No results found for: TSH, FREET4  No results found for: LITHIUM  No results found for: VALPROATE, CBMZ    Treatment Plan:  Reviewed current Medications with the patient. Increase trazodone 100 mg    Risks, benefits, side effects, drug-to-drug interactions and alternatives to treatment were discussed. The patient consented to treatment. Encourage patient to attend group and other milieu activities. Discharge planning discussed with the patient and treatment team.    PSYCHOTHERAPY/COUNSELING:  [] Therapeutic interview  [x] Supportive  [] CBT  [] Ongoing  [] Other    [x] Patient continues to need, on a daily basis, active treatment furnished directly by or requiring the supervision of inpatient psychiatric personnel      Anticipated Length of stay: to be determined      Camila Brewer is a 38 y.o. male being evaluated face to face.      --CRISTINA Schreiber - CNP on 5/6/2021 at 12:41 PM    An electronic signature was used to authenticate this note. **This report has been created using voice recognition software. It may contain minor errors which are inherent in voice recognition technology. **                                         Psychiatry Attending Attestation     I independently saw and evaluated the patient. I reviewed the nurse practitioner's documentation above. Any additional comments or changes to the nurse practitioners documentation are stated below otherwise agree with assessment. Patient largely remains isolated to his room. Reports that his suicidal thoughts are improving however he continues to have active thoughts that are bothering him. Contracts for safety here on the unit. Reports that he continues to sleep poor. Notes Zyprexa helped some. Mentions that he is tolerating it well without any side effects. Discussed with him about increasing trazodone. He understood and agreed to the plan.     Electronically signed by Melissa Benitez MD on 5/6/21 at 4:16 PM EDT

## 2021-05-06 NOTE — CONSULTS
pen Inject 55 Units into the skin nightly   Yes Historical Provider, MD   pregabalin (LYRICA) 100 MG capsule Take 100 mg by mouth 3 times daily. Yes Historical Provider, MD        Allergies:     Patient has no known allergies. Social History:     Tobacco:    reports that he has never smoked. He does not have any smokeless tobacco history on file. Alcohol:      reports previous alcohol use. Drug Use:  reports previous drug use. Family History:     History reviewed. No pertinent family history. Review of Systems:     Positive and Negative as described in HPI. CONSTITUTIONAL:  negative for fevers, chills, sweats, fatigue, weight loss  HEENT:  negative for vision, hearing changes, runny nose, throat pain  RESPIRATORY:  negative for shortness of breath, cough, congestion, wheezing. CARDIOVASCULAR:  negative for chest pain, palpitations.   GASTROINTESTINAL:  negative for nausea, vomiting, diarrhea, constipation, change in bowel habits, abdominal pain   GENITOURINARY:  negative for difficulty of urination, burning with urination, frequency   INTEGUMENT:  negative for rash, skin lesions, easy bruising   HEMATOLOGIC/LYMPHATIC:  negative for swelling/edema   ALLERGIC/IMMUNOLOGIC:  negative for urticaria , itching  ENDOCRINE:  negative increase in drinking, increase in urination, hot or cold intolerance  MUSCULOSKELETAL:  negative joint pains, muscle aches, swelling of joints  NEUROLOGICAL:  negative for headaches, dizziness, lightheadedness, numbness, pain, tingling extremities      Physical Exam:     /65   Pulse 74   Temp 97.4 °F (36.3 °C) (Oral)   Resp 14   Ht 6' 1\" (1.854 m)   Wt 235 lb (106.6 kg)   SpO2 98%   BMI 31.00 kg/m²   Temp (24hrs), Av.7 °F (36.5 °C), Min:97.4 °F (36.3 °C), Max:97.9 °F (36.6 °C)    Recent Labs     21  1649 21  0738 21  1143   POCGLU 273* 267* 160* 177*     No intake or output data in the 24 hours ending 21 1228    General Appearance:  alert, well appearing, and in no acute distress  Mental status: oriented to person, place, and time with normal affect  Head:  normocephalic, atraumatic. Eye: no icterus, redness, pupils equal and reactive, extraocular eye movements intact, conjunctiva clear  Ear: normal external ear, no discharge, hearing intact  Nose:  no drainage noted  Mouth: mucous membranes moist  Neck: supple, no carotid bruits, thyroid not palpable  Lungs: Bilateral equal air entry, clear to ausculation, no wheezing, rales or rhonchi, normal effort  Cardiovascular: normal rate, regular rhythm, no murmur, gallop, rub.   Abdomen: Soft, nontender, nondistended, normal bowel sounds, no hepatomegaly or splenomegaly  Neurologic: There are no new focal motor or sensory deficits, normal muscle tone and bulk, no abnormal sensation, normal speech, cranial nerves II through XII grossly intact  Skin: No gross lesions, rashes, bruising or bleeding on exposed skin area  Extremities:  peripheral pulses palpable, no pedal edema or calf pain with palpation      Investigations:      Laboratory Testing:  Recent Results (from the past 24 hour(s))   POC Glucose Fingerstick    Collection Time: 05/05/21  4:49 PM   Result Value Ref Range    POC Glucose 273 (H) 75 - 110 mg/dL   POC Glucose Fingerstick    Collection Time: 05/05/21  8:17 PM   Result Value Ref Range    POC Glucose 267 (H) 75 - 110 mg/dL   POC Glucose Fingerstick    Collection Time: 05/06/21  7:38 AM   Result Value Ref Range    POC Glucose 160 (H) 75 - 110 mg/dL   POC Glucose Fingerstick    Collection Time: 05/06/21 11:43 AM   Result Value Ref Range    POC Glucose 177 (H) 75 - 110 mg/dL           Consultations:   IP CONSULT TO INTERNAL MEDICINE  Assessment :      Primary Problem  MDD (major depressive disorder), recurrent, severe, with psychosis ([facility]    Active Hospital Problems    Diagnosis Date Noted    DM type 2, uncontrolled, with neuropathy ([facility] [E11.40, E11.65] 05/05/2021   

## 2021-05-06 NOTE — PLAN OF CARE
[address] [place] [address]  Day 3 Interdisciplinary Treatment Plan NOTE    Review Date & Time: 5/6/2021   0929    Admission Type:   Admission Type: Voluntary    Reason for admission:  Reason for Admission: depression with suicidal ideation  Estimated Length of Stay: 5-7 days  Estimated Discharge Date Update: to be determined by physician    PATIENT STRENGTHS:  Patient Strengths Strengths: No significant Physical Illness  Patient Strengths and Limitations:Limitations: Difficulty problem solving/relies on others to help solve problems  Addictive Behavior:Addictive Behavior  In the past 3 months, have you felt or has someone told you that you have a problem with:  : None  Do you have a history of Chemical Use?: No  Do you have a history of Alcohol Use?: No  Do you have a history of Street Drug Abuse?: No  Histroy of Prescripton Drug Abuse?: No  Medical Problems:  Past Medical History:   Diagnosis Date    Diabetes mellitus ([facility]     Schizophrenia ([facility]        Risk:  Fall RiskTotal: 53  Samuel Scale Samuel Scale Score: 22  BVC Total: 0  Change in scores no Changes to plan of Care no    Status EXAM:   Status and Exam  Normal: No  Facial Expression: Flat  Affect: Blunt  Level of Consciousness: Alert  Mood:Normal: No  Mood: Depressed  Motor Activity:Normal: Yes  Motor Activity: Decreased  Interview Behavior: Cooperative, Evasive  Preception: [place] to Person, Oriana Shackleton to Time, [place] to Place, [place] to Situation  Attention:Normal: Yes  Attention: Unable to Concentrate  Thought Processes: Circumstantial  Thought Content:Normal: No  Thought Content: Poverty of Content  Hallucinations: None  Delusions: No  Memory:Normal: No  Memory: Poor Recent  Insight and Judgment: No  Insight and Judgment: Poor Insight  Present Suicidal Ideation: No  Present Homicidal Ideation: No    Daily Assessment Last Entry:   Daily Sleep (WDL): Within Defined Limits         Patient Currently in Pain: No  Daily Nutrition (WDL): Within Defined Limits

## 2021-05-06 NOTE — PLAN OF CARE
Problem: Altered Mood, Depressive Behavior:  Goal: Able to verbalize and/or display a decrease in depressive symptoms  Description: Able to verbalize and/or display a decrease in depressive symptoms  5/6/2021 0848 by Walter Young RN  Outcome: Ongoing    1:1 with pt x ten minutes. Pt encouraged to attend unit programming and interact with peers and staff. Pt also encouraged to tend to hygiene and ADLs. Pt encouraged to discuss feelings with staff and feedback and reassurance provided. Pt denies thoughts of self harm and is agreeable to seeking out should thoughts of self harm arise. Safe environment maintained. Q15 minute checks for safety cont per unit policy. Will cont to monitor for safety and provides support and reassurance as needed. Pt seclusive to room for long intervals. Evasive when asked questions.  Refused groups

## 2021-05-06 NOTE — PLAN OF CARE
Problem: Altered Mood, Depressive Behavior:  Goal: Able to verbalize and/or display a decrease in depressive symptoms  Description: Able to verbalize and/or display a decrease in depressive symptoms  Note: PT remains flat and isolative to room. PT remains disheveled. PT reports he has been sleeping well. PT states he is still having depression but was unable to rate. PT is taking ordered medications. Problem: Altered Mood, Depressive Behavior:  Goal: Ability to disclose and discuss suicidal ideas will improve  Description: Ability to disclose and discuss suicidal ideas will improve  Note: Pt denies thoughts of self harm and is agreeable to seeking out should thoughts of self harm arise. Safe environment maintained. Q15 minute checks for safety cont per unit policy. Will cont to monitor for safety and provides support and reassurance as needed.

## 2021-05-06 NOTE — GROUP NOTE
Group Therapy Note    Date: 5/6/2021    Group Start Time: 0900  Group End Time: 0915  Group Topic: Community Meeting    [address] [place] [address]    Patient refused to attend Community Meeting Group at 0900 after encouragement from staff. 1:1 talk time offered.     Signature:  Mayank Kauffman

## 2021-05-06 NOTE — GROUP NOTE
Group Therapy Note    Date: 5/6/2021    Group Start Time: 1000  Group End Time: 2956  Group Topic: Recreational    [facility] Twylla Cranker        Group Therapy Note    Pt did not attend recreational group d/t resting in room despite staff invitation to attend. 1:1 talk time offered as alternative to group session, pt declined.

## 2021-05-07 LAB
GLUCOSE BLD-MCNC: 170 MG/DL (ref 75–110)
GLUCOSE BLD-MCNC: 209 MG/DL (ref 75–110)
GLUCOSE BLD-MCNC: 214 MG/DL (ref 75–110)
GLUCOSE BLD-MCNC: 306 MG/DL (ref 75–110)

## 2021-05-07 PROCEDURE — 6370000000 HC RX 637 (ALT 250 FOR IP): Performed by: NURSE PRACTITIONER

## 2021-05-07 PROCEDURE — 1240000000 HC EMOTIONAL WELLNESS R&B

## 2021-05-07 PROCEDURE — 6370000000 HC RX 637 (ALT 250 FOR IP): Performed by: PSYCHIATRY & NEUROLOGY

## 2021-05-07 PROCEDURE — 82947 ASSAY GLUCOSE BLOOD QUANT: CPT

## 2021-05-07 PROCEDURE — 6370000000 HC RX 637 (ALT 250 FOR IP): Performed by: INTERNAL MEDICINE

## 2021-05-07 PROCEDURE — 99232 SBSQ HOSP IP/OBS MODERATE 35: CPT | Performed by: PSYCHIATRY & NEUROLOGY

## 2021-05-07 RX ORDER — OLANZAPINE 10 MG/1
10 TABLET ORAL NIGHTLY
Status: DISCONTINUED | OUTPATIENT
Start: 2021-05-07 | End: 2021-05-11 | Stop reason: HOSPADM

## 2021-05-07 RX ADMIN — INSULIN LISPRO 4 UNITS: 100 INJECTION, SOLUTION INTRAVENOUS; SUBCUTANEOUS at 21:08

## 2021-05-07 RX ADMIN — INSULIN LISPRO 4 UNITS: 100 INJECTION, SOLUTION INTRAVENOUS; SUBCUTANEOUS at 12:14

## 2021-05-07 RX ADMIN — OLANZAPINE 10 MG: 10 TABLET, FILM COATED ORAL at 21:09

## 2021-05-07 RX ADMIN — INSULIN LISPRO 4 UNITS: 100 INJECTION, SOLUTION INTRAVENOUS; SUBCUTANEOUS at 17:55

## 2021-05-07 RX ADMIN — TRAZODONE HYDROCHLORIDE 100 MG: 100 TABLET ORAL at 21:09

## 2021-05-07 RX ADMIN — HYDROXYZINE HYDROCHLORIDE 50 MG: 50 TABLET, FILM COATED ORAL at 21:09

## 2021-05-07 RX ADMIN — INSULIN GLARGINE 27 UNITS: 100 INJECTION, SOLUTION SUBCUTANEOUS at 21:08

## 2021-05-07 RX ADMIN — INSULIN LISPRO 2 UNITS: 100 INJECTION, SOLUTION INTRAVENOUS; SUBCUTANEOUS at 10:04

## 2021-05-07 RX ADMIN — INSULIN GLARGINE 27 UNITS: 100 INJECTION, SOLUTION SUBCUTANEOUS at 10:04

## 2021-05-07 NOTE — GROUP NOTE
Group Therapy Note    Date: 5/7/2021    Group Start Time: 1000  Group End Time: 0628  Group Topic: Psychoeducation    CLEMENTE Herrera      Patient declined to attend self expression group at 1000 despite encouragement from staff. 1:1 talk time offered by staff as alternative to group session.       Signature:  Luis Pearce

## 2021-05-07 NOTE — GROUP NOTE
Group Therapy Note    Date: 5/7/2021    Group Start Time: 1430  Group End Time: 7026  Group Topic: Recreational    [facility] [place], [address]         Group Therapy Note    Attendees: 8/15         Pt did not attend RT skills group d/t resting in room despite staff invitation to attend. 1:1 talk time offered as alternative to group session, pt declined.

## 2021-05-07 NOTE — PROGRESS NOTES
BEHAVIORAL HEALTH FOLLOW-UP NOTE     5/7/2021     Patient was seen and examined in person, Chart reviewed   Patient's case discussed with staff/team    Chief Complaint: Command hallucinations telling him to harm himself    Interim History: Patient maintains medication compliance and has not required any emergency as needed medications. He did take Atarax for anxiety trazodone for sleep last night around 8:30 PM.  During assessment, he initially stated the increased dose of trazodone that was started yesterday improved his sleep. He then later stated that he did not sleep well, contradicting himself. He states that his mood is \"not getting any better\" and reports that he is still depressed. He continues to report both his anxiety and depression are 10 out of 10, with 10 being the highest.  He continues to isolate to his room, does not go to the day room, and does not participate in group activities. He did shower yesterday. He continues to report auditory hallucinations that are command in nature. He also continues to report suicidal ideations from the auditory hallucinations. We discussed increasing Zyprexa, as he believes that this initially helped him some. The patient continues to be able to contract for safety and denies any further questions or concerns. Appetite:   [x] Normal/Unchanged  [] Increased  [] Decreased      Sleep:       [] Normal/Unchanged  [] Fair       [x] Poor              Energy:    [] Normal/Unchanged  [] Increased  [x] Decreased        Aggression:  [] yes  [x] no    Patient is [x] able  [] unable to CONTRACT FOR SAFETY ON THE UNIT    PAST MEDICAL/PSYCHIATRIC HISTORY:   Past Medical History:   Diagnosis Date    Diabetes mellitus ([facility]     Schizophrenia ([facility]        FAMILY/SOCIAL HISTORY:  History reviewed. No pertinent family history.   Social History     Socioeconomic History    Marital status: Single     Spouse name: Not on file    Number of children: Not on file    Years of education: Not on file    Highest education level: Not on file   Occupational History    Not on file   Social Needs    Financial resource strain: Not on file    Food insecurity     Worry: Not on file     Inability: Not on file    Transportation needs     Medical: Not on file     Non-medical: Not on file   Tobacco Use    Smoking status: Never Smoker   Substance and Sexual Activity    Alcohol use: Not Currently    Drug use: Not Currently    Sexual activity: Not on file   Lifestyle    Physical activity     Days per week: Not on file     Minutes per session: Not on file    Stress: Not on file   Relationships    Social connections     Talks on phone: Not on file     Gets together: Not on file     Attends [employer] service: Not on file     Active member of club or organization: Not on file     Attends meetings of clubs or organizations: Not on file     Relationship status: Not on file    Intimate partner violence     Fear of current or ex partner: Not on file     Emotionally abused: Not on file     Physically abused: Not on file     Forced sexual activity: Not on file   Other Topics Concern    Not on file   Social History Narrative    Not on file           ROS:  [x] All negative/unchanged except if checked.  Explain positive(checked items) below:  [] Constitutional  [] Eyes  [] Ear/Nose/Mouth/Throat  [] Respiratory  [] CV  [] GI  []   [] Musculoskeletal  [] Skin/Breast  [] Neurological  [] Endocrine  [] Heme/Lymph  [] Allergic/Immunologic    Explanation:     MEDICATIONS:    Current Facility-Administered Medications:     OLANZapine (ZYPREXA) tablet 10 mg, 10 mg, Oral, Nightly, CRISTINA Camejo CNP    insulin glargine (LANTUS) injection vial 27 Units, 27 Units, Subcutaneous, BID, Cecelia Garcia MD, 27 Units at 05/07/21 1004    traZODone (DESYREL) tablet 100 mg, 100 mg, Oral, Nightly PRN, Lacinda Frankel, APRN - CNP, 100 mg at 05/06/21 2033    acetaminophen (TYLENOL) tablet 650 mg, 650 mg, Oral, Q4H PRN, Montez Oliva MD    aluminum & magnesium hydroxide-simethicone (MAALOX) 200-200-20 MG/5ML suspension 30 mL, 30 mL, Oral, Q6H PRN, Montez Oliva MD    hydrOXYzine (ATARAX) tablet 50 mg, 50 mg, Oral, TID PRN, Montez Oliva MD, 50 mg at 05/06/21 2033    ibuprofen (ADVIL;MOTRIN) tablet 400 mg, 400 mg, Oral, Q6H PRN, Montez Oliva MD    polyethylene glycol (GLYCOLAX) packet 17 g, 17 g, Oral, Daily PRN, Montez Oliva MD    nicotine polacrilex (NICORETTE) gum 2 mg, 2 mg, Oral, PRN, Montez Oliva MD    nicotine (NICODERM CQ) 14 MG/24HR 1 patch, 1 patch, Transdermal, Daily, Montez Oliva MD, 1 patch at 05/07/21 1009    glucose (GLUTOSE) 40 % oral gel 15 g, 15 g, Oral, PRN, CRISTINA Valiente CNP    dextrose 50 % IV solution, 12.5 g, Intravenous, PRN, CRISTINA Valiente CNP    glucagon (rDNA) injection 1 mg, 1 mg, Intramuscular, PRN, CRISTINA Valiente CNP    dextrose 5 % solution, 100 mL/hr, Intravenous, PRN, CRISTINA Valiente CNP    insulin lispro (HUMALOG) injection vial 0-12 Units, 0-12 Units, Subcutaneous, TID WC, CRISTINA Valiente CNP, 4 Units at 05/07/21 1214    insulin lispro (HUMALOG) injection vial 0-6 Units, 0-6 Units, Subcutaneous, Nightly, CRISTINA Valiente CNP, 5 Units at 05/06/21 2031      Examination:  /60   Pulse 69   Temp 98.1 °F (36.7 °C) (Oral)   Resp 14   Ht 6' 1\" (1.854 m)   Wt 235 lb (106.6 kg)   SpO2 98%   BMI 31.00 kg/m²   Gait - steady  Medication side effects(SE): denies    Mental Status Examination:    Level of consciousness:  within normal limits   Appearance:  fair grooming and fair hygiene  Behavior/Motor:  no abnormalities noted  Attitude toward examiner:  cooperative and fair eye contact  Speech:  normal rate and normal volume   Mood: depressed  Affect:  mood congruent  Thought processes:  linear and coherent   Thought content:  Homicidal ideation - none  Suicidal Ideation: active  Delusions:  no evidence of delusions  Perceptual Disturbance:  auditory  Cognition:  oriented to person, place, and time   Concentration distractible  Insight poor   Judgement poor     ASSESSMENT:   Patient symptoms are:  [] Well controlled  [] Improving  [] Worsening  [x] No change      Diagnosis:   Principal Problem:    MDD (major depressive disorder), recurrent, severe, with psychosis ([facility]  Active Problems:    Paranoid schizophrenia ([facility]    DM type 2, uncontrolled, [facility]  Resolved Problems:    * No resolved hospital problems. *      LABS:    Recent Labs     05/04/21  1340   WBC 9.5   HGB 15.1        Recent Labs     05/04/21  1340   *   K 4.3   CL 98   CO2 25   BUN 10   CREATININE 0.74   GLUCOSE 301*     No results for input(s): BILITOT, ALKPHOS, AST, ALT in the last 72 hours. Lab Results   Component Value Date    BARBSCNU NEGATIVE 05/04/2021    LABBENZ NEGATIVE 05/04/2021    LABMETH NEGATIVE 05/04/2021    PPXUR NOT REPORTED 05/04/2021     No results found for: TSH, FREET4  No results found for: LITHIUM  No results found for: VALPROATE, CBMZ    Treatment Plan:  Reviewed current Medications with the patient. Increase Zyprexa to 10 mg    Risks, benefits, side effects, drug-to-drug interactions and alternatives to treatment were discussed. The patient consented to treatment. Encourage patient to attend group and other milieu activities. Discharge planning discussed with the patient and treatment team.    PSYCHOTHERAPY/COUNSELING:  [] Therapeutic interview  [x] Supportive  [] CBT  [] Ongoing  [] Other    [x] Patient continues to need, on a daily basis, active treatment furnished directly by or requiring the supervision of inpatient psychiatric personnel      Anticipated Length of stay: to be determined      Brendon Lagunas is a 38 y.o. male being evaluated face to face.      --CRISTINA Schmitt CNP on 5/7/2021 at 12:50 PM    An electronic signature was used to authenticate this note. **This report has been created using voice recognition software. It may contain minor errors which are inherent in voice recognition technology. **                                            Psychiatry Attending Attestation     I independently saw and evaluated the patient. I reviewed the Advance Practice Provider's documentation above. Any additional comments or changes to the Advance Practice Provider's documentation are stated below otherwise agree with assessment. Patient continues to remain isolated to his room. Notes that he has been having active auditory hallucinations asking him to hurt self at nighttime. Reports that this is been a big bother for him. Discussed with him about titrating Zyprexa at nighttime to help with both sleep and these voices. He understood and agreed to the plan. Discussed with him about trying some group therapy today. He agreed to the plan.     Electronically signed by Polina Sevilla MD on 5/7/21 at 1:54 PM EDT

## 2021-05-07 NOTE — GROUP NOTE
Group Therapy Note    Date: 5/7/2021    Group Start Time: 1100  Group End Time: 1150  Group Topic: Psychotherapy    RANJANA Merchant LSW        Group Therapy Note    patient refused to attend psychotherapy group at 11:00am after encouragement from staff.          Signature:  RANJANA Lock LSW

## 2021-05-07 NOTE — GROUP NOTE
Group Therapy Note    Date: 5/7/2021    Group Start Time: 0900  Group End Time: 3314  Group Topic: [address]      Patient declined to attend community meeting/goal setting group at 0900 despite encouragement from staff. 1:1 talk time offered by staff as alternative to group session.         Signature:  Adri Odom

## 2021-05-07 NOTE — GROUP NOTE
Group Therapy Note    Date: 5/7/2021    Group Start Time: 1330  Group End Time: 2613  Group Topic: Psychoeducation    CLEMENTE Herrera    Patient declined to attend coping skills group at 1330 despite encouragement from staff. 1:1 talk time offered by staff as alternative to group session.       Signature:  Gavin Gregorio

## 2021-05-07 NOTE — PLAN OF CARE
Problem: Altered Mood, Depressive Behavior:  Goal: Able to verbalize and/or display a decrease in depressive symptoms  Description: Able to verbalize and/or display a decrease in depressive symptoms  Note: Pt states he is feeling better this evening pt states his depression and anxiety are a 6 out of 10. Pt states this is the best they have been since admission. PT is out more this evening and social with select peers. Pt declined unit programming. Problem: Altered Mood, Depressive Behavior:  Goal: Ability to disclose and discuss suicidal ideas will improve  Description: Ability to disclose and discuss suicidal ideas will improve  Note: Pt denies thoughts of self harm and is agreeable to seeking out should thoughts of self harm arise. Safe environment maintained. Q15 minute checks for safety cont per unit policy. Will cont to monitor for safety and provides support and reassurance as needed. Problem: Altered Mood, Manic Behavior:  Goal: Able to verbalize decrease in frequency and intensity of racing thoughts  Description: Able to verbalize decrease in frequency and intensity of racing thoughts  Note: PT states the racing thoughts are lessened and states he was able to sleep better the night before and appears to have slept well this evening.

## 2021-05-07 NOTE — BH NOTE
Pt declines to attend 1600 Wellness group. Pt. Offered 1:1 talk time as an alternative to group. Pt. Declines.

## 2021-05-08 LAB
GLUCOSE BLD-MCNC: 151 MG/DL (ref 75–110)
GLUCOSE BLD-MCNC: 246 MG/DL (ref 75–110)
GLUCOSE BLD-MCNC: 264 MG/DL (ref 75–110)
GLUCOSE BLD-MCNC: 327 MG/DL (ref 75–110)

## 2021-05-08 PROCEDURE — 99232 SBSQ HOSP IP/OBS MODERATE 35: CPT | Performed by: NURSE PRACTITIONER

## 2021-05-08 PROCEDURE — 1240000000 HC EMOTIONAL WELLNESS R&B

## 2021-05-08 PROCEDURE — 82947 ASSAY GLUCOSE BLOOD QUANT: CPT

## 2021-05-08 PROCEDURE — 99232 SBSQ HOSP IP/OBS MODERATE 35: CPT | Performed by: INTERNAL MEDICINE

## 2021-05-08 PROCEDURE — 6370000000 HC RX 637 (ALT 250 FOR IP): Performed by: NURSE PRACTITIONER

## 2021-05-08 PROCEDURE — 6370000000 HC RX 637 (ALT 250 FOR IP): Performed by: PSYCHIATRY & NEUROLOGY

## 2021-05-08 PROCEDURE — 6370000000 HC RX 637 (ALT 250 FOR IP): Performed by: INTERNAL MEDICINE

## 2021-05-08 RX ORDER — INSULIN GLARGINE 100 [IU]/ML
30 INJECTION, SOLUTION SUBCUTANEOUS 2 TIMES DAILY
Status: DISCONTINUED | OUTPATIENT
Start: 2021-05-08 | End: 2021-05-09

## 2021-05-08 RX ADMIN — INSULIN GLARGINE 30 UNITS: 100 INJECTION, SOLUTION SUBCUTANEOUS at 20:34

## 2021-05-08 RX ADMIN — OLANZAPINE 10 MG: 10 TABLET, FILM COATED ORAL at 20:35

## 2021-05-08 RX ADMIN — INSULIN LISPRO 2 UNITS: 100 INJECTION, SOLUTION INTRAVENOUS; SUBCUTANEOUS at 08:14

## 2021-05-08 RX ADMIN — INSULIN LISPRO 6 UNITS: 100 INJECTION, SOLUTION INTRAVENOUS; SUBCUTANEOUS at 16:59

## 2021-05-08 RX ADMIN — TRAZODONE HYDROCHLORIDE 100 MG: 100 TABLET ORAL at 20:35

## 2021-05-08 RX ADMIN — INSULIN LISPRO 4 UNITS: 100 INJECTION, SOLUTION INTRAVENOUS; SUBCUTANEOUS at 11:54

## 2021-05-08 RX ADMIN — HYDROXYZINE HYDROCHLORIDE 50 MG: 50 TABLET, FILM COATED ORAL at 20:35

## 2021-05-08 RX ADMIN — INSULIN LISPRO 4 UNITS: 100 INJECTION, SOLUTION INTRAVENOUS; SUBCUTANEOUS at 20:34

## 2021-05-08 RX ADMIN — INSULIN GLARGINE 27 UNITS: 100 INJECTION, SOLUTION SUBCUTANEOUS at 08:14

## 2021-05-08 NOTE — GROUP NOTE
Group Therapy Note    Date: 5/8/2021    Group Start Time: 1600  Group End Time: 1640  Group Topic: Healthy Living/Wellness    JANETH SOHA Rolon        Group Therapy Note    Attendees: 10/16         Patient's Goal:  To attend and participate in group therapy. Notes:  Self-Care    Status After Intervention:  Unchanged    Participation Level:  Active Listener and Interactive    Participation Quality: Appropriate, Attentive and Sharing      Speech:  normal      Thought Process/Content: Logical  Linear      Affective Functioning: Congruent      Mood: euthymic      Level of consciousness:  Oriented x4      Response to Learning: Able to verbalize current knowledge/experience      Endings: None Reported    Modes of Intervention: Education, Support and Socialization      Discipline Responsible: Behavorial Health Tech      Signature:  Juan Rolon

## 2021-05-08 NOTE — PLAN OF CARE
Problem: Altered Mood, Depressive Behavior:  Goal: Able to verbalize and/or display a decrease in depressive symptoms  Description: Able to verbalize and/or display a decrease in depressive symptoms  Note: Pt states he is feeling better this evening pt states his depression and anxiety are a 5 out of 10. PT is out more this evening and social with select peers. Pt attended unit programming. Problem: Altered Mood, Depressive Behavior:  Goal: Ability to disclose and discuss suicidal ideas will improve  Description: Ability to disclose and discuss suicidal ideas will improve  Note: Pt denies thoughts of self harm and is agreeable to seeking out should thoughts of self harm arise. Safe environment maintained. Q15 minute checks for safety cont per unit policy. Will cont to monitor for safety and provides support and reassurance as needed. Problem: Altered Mood, Manic Behavior:  Goal: Able to verbalize decrease in frequency and intensity of racing thoughts  Description: Able to verbalize decrease in frequency and intensity of racing thoughts  Note: PT states the racing thoughts are lessened and states he was able to sleep better the night before and appears to have slept well this evening. Pt admits to auditory hallucinations but does not discuss the content.

## 2021-05-08 NOTE — BH NOTE
Group Therapy Note     Date: 5/8/2021     Group Start Time: 1100   Group End Time: 1130   Group Topic: Group Therapy     CLEMENTE HUERTAVANESA VELMA Hennessy, RN      Group Therapy Note  Pt declined to attend Wellness Group at 1100 am despite encouragement. Pt offered 1:1 and refused.       Attendees:7/16   Signature:  VELMA Nguyễn, RN

## 2021-05-08 NOTE — GROUP NOTE
Group Therapy Note    Date: 5/8/2021    Group Start Time: 1000  Group End Time: 6330  Group Topic: Group Therapy    RANJANA Francisco LSW        Group Therapy Note  Pt declined to attend psychotherapy at 1000 am despite encouragement. Pt offered 1:1 and refused.     Attendees:7/16             Signature:  RANJANA Moreno, VANESSA

## 2021-05-08 NOTE — PROGRESS NOTES
resource strain: Not on file    Food insecurity     Worry: Not on file     Inability: Not on file    Transportation needs     Medical: Not on file     Non-medical: Not on file   Tobacco Use    Smoking status: Never Smoker   Substance and Sexual Activity    Alcohol use: Not Currently    Drug use: Not Currently    Sexual activity: Not on file   Lifestyle    Physical activity     Days per week: Not on file     Minutes per session: Not on file    Stress: Not on file   Relationships    Social connections     Talks on phone: Not on file     Gets together: Not on file     Attends Yarsani service: Not on file     Active member of club or organization: Not on file     Attends meetings of clubs or organizations: Not on file     Relationship status: Not on file    Intimate partner violence     Fear of current or ex partner: Not on file     Emotionally abused: Not on file     Physically abused: Not on file     Forced sexual activity: Not on file   Other Topics Concern    Not on file   Social History Narrative    Not on file           ROS:  [x] All negative/unchanged except if checked.  Explain positive(checked items) below:  [] Constitutional  [] Eyes  [] Ear/Nose/Mouth/Throat  [] Respiratory  [] CV  [] GI  []   [] Musculoskeletal  [] Skin/Breast  [] Neurological  [] Endocrine  [] Heme/Lymph  [] Allergic/Immunologic    Explanation:     MEDICATIONS:    Current Facility-Administered Medications:     insulin glargine (LANTUS) injection vial 30 Units, 30 Units, Subcutaneous, BID, Jose Rip Toro MD    OLANZapine (ZYPREXA) tablet 10 mg, 10 mg, Oral, Nightly, CRISTINA Camejo - CNP, 10 mg at 05/07/21 2109    traZODone (DESYREL) tablet 100 mg, 100 mg, Oral, Nightly PRN, CRISTINA Snowden - CNP, 100 mg at 05/07/21 2109    acetaminophen (TYLENOL) tablet 650 mg, 650 mg, Oral, Q4H PRN, Dionna Mena MD    aluminum & magnesium hydroxide-simethicone (MAALOX) 200-200-20 MG/5ML suspension 30 mL, 30 mL, Oral, Q6H PRN, Madiha Albarran MD    hydrOXYzine (ATARAX) tablet 50 mg, 50 mg, Oral, TID PRN, Madiha Albarran MD, 50 mg at 05/07/21 2109    ibuprofen (ADVIL;MOTRIN) tablet 400 mg, 400 mg, Oral, Q6H PRN, Madiha Albarran MD    polyethylene glycol (GLYCOLAX) packet 17 g, 17 g, Oral, Daily PRN, Madiha Albarran MD    nicotine polacrilex (NICORETTE) gum 2 mg, 2 mg, Oral, PRN, Madiha Albarran MD    nicotine (NICODERM CQ) 14 MG/24HR 1 patch, 1 patch, Transdermal, Daily, Madiha Albarran MD, 1 patch at 05/07/21 1009    glucose (GLUTOSE) 40 % oral gel 15 g, 15 g, Oral, PRN, Dinoimer Nuvia, APRN - CNP    dextrose 50 % IV solution, 12.5 g, Intravenous, PRN, Casimer Nuvia, APRN - CNP    glucagon (rDNA) injection 1 mg, 1 mg, Intramuscular, PRN, Casimer Nuvia, APRN - CNP    dextrose 5 % solution, 100 mL/hr, Intravenous, PRN, Casimer Nuvia, APRN - CNP    insulin lispro (HUMALOG) injection vial 0-12 Units, 0-12 Units, Subcutaneous, TID WC, Casimer Nuvia, APRN - CNP, 4 Units at 05/08/21 1154    insulin lispro (HUMALOG) injection vial 0-6 Units, 0-6 Units, Subcutaneous, Nightly, Casimer Nuvia, APRN - CNP, 4 Units at 05/07/21 2108      Examination:  /60   Pulse 64   Temp 98.1 °F (36.7 °C) (Oral)   Resp 14   Ht 6' 1\" (1.854 m)   Wt 235 lb (106.6 kg)   SpO2 98%   BMI 31.00 kg/m²   Gait - steady  Medication side effects(SE): denies    Mental Status Examination:    Level of consciousness:  within normal limits   Appearance:  poor grooming and poor hygiene  Behavior/Motor:  no abnormalities noted  Attitude toward examiner:  cooperative and fair eye contact  Speech:  normal rate and normal volume   Mood: depressed but \"a little better\"  Affect:  mood congruent  Thought processes:  linear and coherent   Thought content:  Homicidal ideation - none  Suicidal Ideation: denies today, last present  yesterday  Delusions:  no evidence of delusions  Perceptual Disturbance: Denies hallucinations  Cognition:  oriented to person, place, and time   Concentration intact  Insight improving  Judgement improving    ASSESSMENT:   Patient symptoms are:  [] Well controlled  [x] Improving  [] Worsening  [] No change      Diagnosis:   Principal Problem:    MDD (major depressive disorder), recurrent, severe, with psychosis ([facility]  Active Problems:    Paranoid schizophrenia ([facility]    DM type 2, uncontrolled, with neuropathy ([facility]  Resolved Problems:    * No resolved hospital problems. *      LABS:    No results for input(s): WBC, HGB, PLT in the last 72 hours. No results for input(s): NA, K, CL, CO2, BUN, CREATININE, GLUCOSE in the last 72 hours. No results for input(s): BILITOT, ALKPHOS, AST, ALT in the last 72 hours. Lab Results   Component Value Date    BARBSCNU NEGATIVE 05/04/2021    LABBENZ NEGATIVE 05/04/2021    LABMETH NEGATIVE 05/04/2021    PPXUR NOT REPORTED 05/04/2021     No results found for: TSH, FREET4  No results found for: LITHIUM  No results found for: VALPROATE, CBMZ    Treatment Plan:  Reviewed current Medications with the patient. Risks, benefits, side effects, drug-to-drug interactions and alternatives to treatment were discussed. The patient consented to treatment. Encourage patient to attend group and other milieu activities. Discharge planning discussed with the patient and treatment team.    PSYCHOTHERAPY/COUNSELING:  [] Therapeutic interview  [x] Supportive  [] CBT  [] Ongoing  [] Other    [x] Patient continues to need, on a daily basis, active treatment furnished directly by or requiring the supervision of inpatient psychiatric personnel      Anticipated Length of stay: to be determined      Tony Schultz is a 38 y.o. male being evaluated face to face. --CRISTINA Richardson CNP on 5/8/2021 at 3:51 PM    An electronic signature was used to authenticate this note. **This report has been created using voice recognition software.  It may contain minor errors which are inherent in voice recognition technology. **

## 2021-05-08 NOTE — GROUP NOTE
Group Therapy Note    Date: 5/8/2021    Group Start Time: 1330  Group End Time: 1435  Group Topic: Music Therapy    CLEMENTE Rogel        Group Therapy Note    Pt did not attend music therapy group d/t resting in room despite staff invitation to attend. 1:1 talk time offered as alternative to group session, pt declined.

## 2021-05-08 NOTE — GROUP NOTE
Group Therapy Note    Date: 5/8/2021    Group Start Time: 0900  Group End Time: 0915  Group Topic: Community Meeting    CLEMENTE Canada        Group Therapy Note    Pt did not attend community meeting group d/t resting in room despite staff invitation to attend. 1:1 talk time offered as alternative to group session, pt declined.

## 2021-05-08 NOTE — PLAN OF CARE
Problem: Altered Mood, Depressive Behavior:  Goal: Ability to disclose and discuss suicidal ideas will improve  Description: Ability to disclose and discuss suicidal ideas will improve  Outcome: Ongoing  Patient is alert, observed in room. Patient is flat and lethargic on approach. Patient is evasive with assessment questions. Patient is currently denying thoughts of wanting to harm self or others. Patient is denying having any depression and anxiety. Patient has been isolative to room, aloof of peers, oversleeping, only coming out for meals. Appetite adequate. Patient is medication compliant, denies any side effects. Patient hygiene is poor, was encouraged to shower today. Patient also encouraged to attend unit programming and socialize with peers. No further concerns voiced. Will continue to monitor. Problem: Altered Mood, Manic Behavior:  Goal: Able to verbalize decrease in frequency and intensity of racing thoughts  Description: Able to verbalize decrease in frequency and intensity of racing thoughts  Outcome: Ongoing  Patient reports not having any tactile, gustatory, auditory, olfactory, or visual hallucinations. Problem: Tobacco Use:  Goal: Inpatient tobacco use cessation counseling participation  Description: Inpatient tobacco use cessation counseling participation  Outcome: Ongoing  Patient reports he is a smoker. Risks and benefits of smoking cessation discussed. Patient refused nicotine patch today. Will continue to educate.

## 2021-05-09 LAB
GLUCOSE BLD-MCNC: 206 MG/DL (ref 75–110)
GLUCOSE BLD-MCNC: 218 MG/DL (ref 75–110)
GLUCOSE BLD-MCNC: 327 MG/DL (ref 75–110)
GLUCOSE BLD-MCNC: 388 MG/DL (ref 75–110)

## 2021-05-09 PROCEDURE — 1240000000 HC EMOTIONAL WELLNESS R&B

## 2021-05-09 PROCEDURE — 82947 ASSAY GLUCOSE BLOOD QUANT: CPT

## 2021-05-09 PROCEDURE — 6370000000 HC RX 637 (ALT 250 FOR IP): Performed by: NURSE PRACTITIONER

## 2021-05-09 PROCEDURE — 6370000000 HC RX 637 (ALT 250 FOR IP): Performed by: PSYCHIATRY & NEUROLOGY

## 2021-05-09 PROCEDURE — 99231 SBSQ HOSP IP/OBS SF/LOW 25: CPT | Performed by: NURSE PRACTITIONER

## 2021-05-09 PROCEDURE — 99232 SBSQ HOSP IP/OBS MODERATE 35: CPT | Performed by: INTERNAL MEDICINE

## 2021-05-09 PROCEDURE — 6370000000 HC RX 637 (ALT 250 FOR IP): Performed by: INTERNAL MEDICINE

## 2021-05-09 RX ORDER — INSULIN GLARGINE 100 [IU]/ML
33 INJECTION, SOLUTION SUBCUTANEOUS 2 TIMES DAILY
Status: DISCONTINUED | OUTPATIENT
Start: 2021-05-09 | End: 2021-05-10

## 2021-05-09 RX ADMIN — INSULIN LISPRO 4 UNITS: 100 INJECTION, SOLUTION INTRAVENOUS; SUBCUTANEOUS at 12:02

## 2021-05-09 RX ADMIN — INSULIN LISPRO 4 UNITS: 100 INJECTION, SOLUTION INTRAVENOUS; SUBCUTANEOUS at 08:05

## 2021-05-09 RX ADMIN — INSULIN LISPRO 8 UNITS: 100 INJECTION, SOLUTION INTRAVENOUS; SUBCUTANEOUS at 17:06

## 2021-05-09 RX ADMIN — OLANZAPINE 10 MG: 10 TABLET, FILM COATED ORAL at 21:56

## 2021-05-09 RX ADMIN — TRAZODONE HYDROCHLORIDE 100 MG: 100 TABLET ORAL at 21:56

## 2021-05-09 RX ADMIN — INSULIN LISPRO 5 UNITS: 100 INJECTION, SOLUTION INTRAVENOUS; SUBCUTANEOUS at 21:57

## 2021-05-09 RX ADMIN — INSULIN GLARGINE 33 UNITS: 100 INJECTION, SOLUTION SUBCUTANEOUS at 21:57

## 2021-05-09 RX ADMIN — HYDROXYZINE HYDROCHLORIDE 50 MG: 50 TABLET, FILM COATED ORAL at 22:02

## 2021-05-09 RX ADMIN — INSULIN GLARGINE 30 UNITS: 100 INJECTION, SOLUTION SUBCUTANEOUS at 08:06

## 2021-05-09 NOTE — PLAN OF CARE
Problem: Altered Mood, Depressive Behavior:  Goal: Able to verbalize and/or display a decrease in depressive symptoms  Description: Able to verbalize and/or display a decrease in depressive symptoms  Note: PT is more social this evening with peers. PT appears brightened. PT states he is still having depression and anxiety but is feeling better. PT reports poor sleep last night but pt had appeared to have slept throughout the night. PT encouraged to let writer know during rounds that he is awake so we can document that he is not sleeping. Tonight pt appears to have slept through out the night. Problem: Altered Mood, Depressive Behavior:  Goal: Ability to disclose and discuss suicidal ideas will improve  Description: Ability to disclose and discuss suicidal ideas will improve  Note: Pt denies thoughts of self harm and is agreeable to seeking out should thoughts of self harm arise. Safe environment maintained. Q15 minute checks for safety cont per unit policy. Will cont to monitor for safety and provides support and reassurance as needed.

## 2021-05-09 NOTE — GROUP NOTE
Group Therapy Note    Date: 5/9/2021    Group Start Time: 1330  Group End Time: 1430  Group Topic: Music Therapy    [facility] Adina Cockayne        Group Therapy Note  Pt did not attend music therapy group d/t resting in room despite staff invitation to attend. 1:1 talk time offered as alternative to group session, pt declined.

## 2021-05-09 NOTE — PROGRESS NOTES
BEHAVIORAL HEALTH FOLLOW-UP NOTE     5/9/2021    Patient was seen and examined in person, Chart reviewed  Patient's case discussed with staff/team    Chief Complaint: Command hallucinations telling him to harm himself    Interim History:   Mr. Missie Moritz was seen for follow-up assessment today. Nursing staff report no overnight behavioral events. Patient remains medication compliant and behaviorally in control. He was approached in the day area where he had been talking on the phone. He was pleasant and engaged in conversation with writer. He continues to endorse auditory hallucinations of voices of people that he knows. They put him down, say mean things to him, and bring up past mistakes. He does state that overall he feels that the voices are becoming less frequent since admission. He denies suicidal and homicidal ideation and stated he was just running away and wanted to kill himself because he was just tired of the voices and wanted to stop. He is denying side effects from his medications but states he continues to have poor sleep at night. He is eating well and his interactions with peers and staff of been appropriate. He has not been attending groups regularly, but did attend a couple yesterday. Patient stated he has talked to his \"wife\" a couple of times today. He states that she is a big source of encouragement for him. Review of records show that the patient reports being single and never been . Patient asked for Klonopin to assist with sleep at night. Patient was told this most likely will not happen. He agreed to contract for safety and had no other questions for writer.     /67   Pulse 72   Temp 98.1 °F (36.7 °C) (Oral)   Resp 14   Ht 6' 1\" (1.854 m)   Wt 235 lb (106.6 kg)   SpO2 98%   BMI 31.00 kg/m²   Appetite: None [x] Normal/Unchanged  [] Increased  [] Decreased      Sleep:       [] Normal/Unchanged  [] Fair       [x] Poor              Energy:    [x] Normal/Unchanged  [] Increased  [] Decreased        Aggression:  [] yes  [x] no    Patient is [x] able  [] unable to CONTRACT FOR SAFETY ON THE UNIT    PAST MEDICAL/PSYCHIATRIC HISTORY:   Past Medical History:   Diagnosis Date    Diabetes mellitus ([facility]     Schizophrenia ([facility]        FAMILY/SOCIAL HISTORY:  History reviewed. No pertinent family history. Social History     Socioeconomic History    Marital status: Single     Spouse name: Not on file    Number of children: Not on file    Years of education: Not on file    Highest education level: Not on file   Occupational History    Not on file   Social Needs    Financial resource strain: Not on file    Food insecurity     Worry: Not on file     Inability: Not on file    Transportation needs     Medical: Not on file     Non-medical: Not on file   Tobacco Use    Smoking status: Never Smoker   Substance and Sexual Activity    Alcohol use: Not Currently    Drug use: Not Currently    Sexual activity: Not on file   Lifestyle    Physical activity     Days per week: Not on file     Minutes per session: Not on file    Stress: Not on file   Relationships    Social connections     Talks on phone: Not on file     Gets together: Not on file     Attends Episcopal service: Not on file     Active member of club or organization: Not on file     Attends meetings of clubs or organizations: Not on file     Relationship status: Not on file    Intimate partner violence     Fear of current or ex partner: Not on file     Emotionally abused: Not on file     Physically abused: Not on file     Forced sexual activity: Not on file   Other Topics Concern    Not on file   Social History Narrative    Not on file           ROS:  [x] All negative/unchanged except if checked.  Explain positive(checked items) below:  [] Constitutional  [] Eyes  [] Ear/Nose/Mouth/Throat  [] Respiratory  [] CV  [] GI  []   [] Musculoskeletal  [] Skin/Breast  [] Neurological  [] Endocrine  [] Heme/Lymph  [] BMI 31.00 kg/m²   Gait - steady  Medication side effects(SE): Denies    Mental Status Examination:    Level of consciousness:  within normal limits   Appearance:  poor grooming and poor hygiene  Behavior/Motor:  no abnormalities noted  Attitude toward examiner:  cooperative and fair eye contact  Speech:  normal rate and normal volume   Mood: depressed but \"a little better\"  Affect:  mood congruent  Thought processes:  linear and coherent   Thought content:  Homicidal ideation - none  Suicidal Ideation:  Denies  Delusions:   Possible delusions of being   Perceptual Disturbance: Denies hallucinations  Cognition:  oriented to person, place, and time   Concentration intact  Insight improving  Judgement improving     ASSESSMENT:   Patient symptoms are:  [] Well controlled  [x] Improving  [] Worsening  [] No change      Diagnosis:   Principal Problem:    MDD (major depressive disorder), recurrent, severe, with psychosis ([facility]  Active Problems:    DM type 2, uncontrolled, with neuropathy ([facility]    [facility] ([facility]  Resolved Problems:    * No resolved hospital problems. *      LABS:    No results for input(s): WBC, HGB, PLT in the last 72 hours. No results for input(s): NA, K, CL, CO2, BUN, CREATININE, GLUCOSE in the last 72 hours. No results for input(s): BILITOT, ALKPHOS, AST, ALT in the last 72 hours. Lab Results   Component Value Date    BARBSCNU NEGATIVE 05/04/2021    LABBENZ NEGATIVE 05/04/2021    LABMETH NEGATIVE 05/04/2021    PPXUR NOT REPORTED 05/04/2021       Treatment Plan:  Reviewed current Medications with the patient; no changes  Risks, benefits, side effects, drug-to-drug interactions and alternatives to treatment were discussed. The patient consented to treatment. Encourage patient to attend group and other milieu activities.   Discharge planning discussed with the patient and treatment team.  Follow-up daily while inpatient    PSYCHOTHERAPY/COUNSELING:  [] Therapeutic interview  [x] Supportive  [] CBT  [] Ongoing  [] Other    [x] Patient continues to need, on a daily basis, active treatment furnished directly by or requiring the supervision of inpatient psychiatric personnel      Anticipated Length of stay: 2 to 3 days                                      Jaylin Gallardo is a 38 y.o. male being evaluated face to face. --Melody Lar, APRN - CNP on 5/9/2021 at 5:47 PM    An electronic signature was used to authenticate this note. **This report has been created using voice recognition software. It may contain minor errors which are inherent in voice recognition technology. **

## 2021-05-09 NOTE — GROUP NOTE
Group Therapy Note    Date: 5/9/2021    Group Start Time: 0900  Group End Time: 0915  Group Topic: Community Meeting    CLEMENTE Garcia Se        Group Therapy Note    Pt did not attend community meeting group d/t resting in room despite staff invitation to attend. 1:1 talk time offered as alternative to group session, pt declined.

## 2021-05-09 NOTE — BH NOTE
Group Therapy Note     Date: 5/9/2021     Group Start Time: 1600  Group End Time: 2675  Group Topic: Wellness group     [address]           Group Therapy Note     Attendees: 10/19           Patient's Goal:  Wellness group     Notes: Coping skills     Status After Intervention:  Improved     Participation Level:  Active Listener and Interactive     Participation Quality: Appropriate, Attentive and Sharing        Speech:  normal        Thought Process/Content: Logical        Affective Functioning: Congruent        Mood: within normal limits        Level of consciousness:  Alert, Oriented x4 and Attentive        Response to Learning: Able to verbalize current knowledge/experience, Able to verbalize/acknowledge new learning, Able to retain information and Progressing to goal        Endings: None Reported     Modes of Intervention: Education, Support and Socialization        Discipline Responsible: LPN        Signature: Cathy Higuera

## 2021-05-09 NOTE — PLAN OF CARE
Problem: Altered Mood, Depressive Behavior:  Goal: Ability to disclose and discuss suicidal ideas will improve  Description: Ability to disclose and discuss suicidal ideas will improve  5/9/2021 1122 by Jyotsna Wu RN  Outcome: Ongoing  Patient is alert, observed in room. Patient is flat on approach, evasive with assessment questions. Patient is currently denying thoughts of wanting to harm self or others. Patient reports not having any tactile, gustatory, auditory, olfactory, or visual hallucinations. Patient remains isolative to room, only comes out of room for meals and brief intervals to watch TV. Appetite adequate. Patient is medication compliant, denies any side effects. Patient hygiene remains poor and disheveled, was encouraged to shower. No further concerns voiced. Will continue to monitor. Problem: Tobacco Use:  Goal: Inpatient tobacco use cessation counseling participation  Description: Inpatient tobacco use cessation counseling participation  Outcome: Ongoing  Patient is a smoker. Risks and benefits of smoking cessation discussed. Patient refuse nicotine patch. Will continue to educate.

## 2021-05-09 NOTE — GROUP NOTE
Group Therapy Note    Date: 5/9/2021    Group Start Time: 1000  Group End Time: 1537  Group Topic: Group Therapy    [facility]    RANJANA Rock, MERLYW        Group Therapy Note    Attendees: 8/18         Pt declined to attend psychotherapy at 1000 am despite encouragement. Pt offered 1:1 and refused.        Signature:  RANJANA Rock, VANESSA

## 2021-05-09 NOTE — CONSULTS
pen Inject 55 Units into the skin nightly   Yes Historical Provider, MD   pregabalin (LYRICA) 100 MG capsule Take 100 mg by mouth 3 times daily. Yes Historical Provider, MD        Allergies:     Patient has no known allergies. Social History:     Tobacco:    reports that he has never smoked. He does not have any smokeless tobacco history on file. Alcohol:      reports previous alcohol use. Drug Use:  reports previous drug use. Family History:     History reviewed. No pertinent family history. Review of Systems:     Positive and Negative as described in HPI. CONSTITUTIONAL:  negative for fevers, chills, sweats, fatigue, weight loss  HEENT:  negative for vision, hearing changes, runny nose, throat pain  RESPIRATORY:  negative for shortness of breath, cough, congestion, wheezing. CARDIOVASCULAR:  negative for chest pain, palpitations.   GASTROINTESTINAL:  negative for nausea, vomiting, diarrhea, constipation, change in bowel habits, abdominal pain   GENITOURINARY:  negative for difficulty of urination, burning with urination, frequency   INTEGUMENT:  negative for rash, skin lesions, easy bruising   HEMATOLOGIC/LYMPHATIC:  negative for swelling/edema   ALLERGIC/IMMUNOLOGIC:  negative for urticaria , itching  ENDOCRINE:  negative increase in drinking, increase in urination, hot or cold intolerance  MUSCULOSKELETAL:  negative joint pains, muscle aches, swelling of joints  NEUROLOGICAL:  negative for headaches, dizziness, lightheadedness, numbness, pain, tingling extremities      Physical Exam:     /67   Pulse 72   Temp 98.1 °F (36.7 °C) (Oral)   Resp 14   Ht 6' 1\" (1.854 m)   Wt 235 lb (106.6 kg)   SpO2 98%   BMI 31.00 kg/m²   Temp (24hrs), Av.3 °F (36.8 °C), Min:98.1 °F (36.7 °C), Max:98.4 °F (36.9 °C)    Recent Labs     21  1639 218 21  0728 21  1141   POCGLU 264* 327* 218* 206*     No intake or output data in the 24 hours ending 21 1314    General Appearance:  alert, well appearing, and in no acute distress  Mental status: oriented to person, place, and time with normal affect  Head:  normocephalic, atraumatic. Eye: no icterus, redness, pupils equal and reactive, extraocular eye movements intact, conjunctiva clear  Ear: normal external ear, no discharge, hearing intact  Nose:  no drainage noted  Mouth: mucous membranes moist  Neck: supple, no carotid bruits, thyroid not palpable  Lungs: Bilateral equal air entry, clear to ausculation, no wheezing, rales or rhonchi, normal effort  Cardiovascular: normal rate, regular rhythm, no murmur, gallop, rub.   Abdomen: Soft, nontender, nondistended, normal bowel sounds, no hepatomegaly or splenomegaly  Neurologic: There are no new focal motor or sensory deficits, normal muscle tone and bulk, no abnormal sensation, normal speech, cranial nerves II through XII grossly intact  Skin: No gross lesions, rashes, bruising or bleeding on exposed skin area  Extremities:  peripheral pulses palpable, no pedal edema or calf pain with palpation      Investigations:      Laboratory Testing:  Recent Results (from the past 24 hour(s))   POC Glucose Fingerstick    Collection Time: 05/08/21  4:39 PM   Result Value Ref Range    POC Glucose 264 (H) 75 - 110 mg/dL   POC Glucose Fingerstick    Collection Time: 05/08/21  8:28 PM   Result Value Ref Range    POC Glucose 327 (H) 75 - 110 mg/dL   POC Glucose Fingerstick    Collection Time: 05/09/21  7:28 AM   Result Value Ref Range    POC Glucose 218 (H) 75 - 110 mg/dL   POC Glucose Fingerstick    Collection Time: 05/09/21 11:41 AM   Result Value Ref Range    POC Glucose 206 (H) 75 - 110 mg/dL           Consultations:   IP CONSULT TO INTERNAL MEDICINE  Assessment :      Primary Problem  MDD (major depressive disorder), recurrent, severe, with psychosis ([facility]    Active Hospital Problems    Diagnosis Date Noted    DM type 2, uncontrolled, with neuropathy ([facility] [E11.40, E11.65] 05/05/2021    Paranoid schizophrenia ([facility] [F20.0] 05/05/2021    MDD (major depressive disorder), recurrent, severe, with psychosis ([facility] [F33.3] 05/05/2021       Plan:     Uncontrolled diabetes mellitus increase Lantus to 33  twice a day  Microscopic hematuria noted us renal no RCC  Obesity class I BMI 31        Fabien Childers MD  5/9/2021  1:14 PM    Copy sent to Dr. Speedy Louie primary care provider on file. Please note that this chart was generated using voice recognition Dragon dictation software. Although every effort was made to ensure the accuracy of this automated transcription, some errors in transcription may have occurred.

## 2021-05-10 LAB
GLUCOSE BLD-MCNC: 139 MG/DL (ref 75–110)
GLUCOSE BLD-MCNC: 247 MG/DL (ref 75–110)
GLUCOSE BLD-MCNC: 263 MG/DL (ref 75–110)
GLUCOSE BLD-MCNC: 269 MG/DL (ref 75–110)

## 2021-05-10 PROCEDURE — 99232 SBSQ HOSP IP/OBS MODERATE 35: CPT | Performed by: PSYCHIATRY & NEUROLOGY

## 2021-05-10 PROCEDURE — 6370000000 HC RX 637 (ALT 250 FOR IP): Performed by: NURSE PRACTITIONER

## 2021-05-10 PROCEDURE — 82947 ASSAY GLUCOSE BLOOD QUANT: CPT

## 2021-05-10 PROCEDURE — 6370000000 HC RX 637 (ALT 250 FOR IP): Performed by: INTERNAL MEDICINE

## 2021-05-10 PROCEDURE — 99231 SBSQ HOSP IP/OBS SF/LOW 25: CPT | Performed by: INTERNAL MEDICINE

## 2021-05-10 PROCEDURE — 1240000000 HC EMOTIONAL WELLNESS R&B

## 2021-05-10 PROCEDURE — 6370000000 HC RX 637 (ALT 250 FOR IP): Performed by: PSYCHIATRY & NEUROLOGY

## 2021-05-10 RX ORDER — INSULIN GLARGINE 100 [IU]/ML
35 INJECTION, SOLUTION SUBCUTANEOUS 2 TIMES DAILY
Status: DISCONTINUED | OUTPATIENT
Start: 2021-05-10 | End: 2021-05-11

## 2021-05-10 RX ADMIN — INSULIN GLARGINE 33 UNITS: 100 INJECTION, SOLUTION SUBCUTANEOUS at 09:07

## 2021-05-10 RX ADMIN — HYDROXYZINE HYDROCHLORIDE 50 MG: 50 TABLET, FILM COATED ORAL at 20:45

## 2021-05-10 RX ADMIN — INSULIN LISPRO 3 UNITS: 100 INJECTION, SOLUTION INTRAVENOUS; SUBCUTANEOUS at 17:55

## 2021-05-10 RX ADMIN — INSULIN LISPRO 6 UNITS: 100 INJECTION, SOLUTION INTRAVENOUS; SUBCUTANEOUS at 12:13

## 2021-05-10 RX ADMIN — OLANZAPINE 10 MG: 10 TABLET, FILM COATED ORAL at 20:44

## 2021-05-10 RX ADMIN — TRAZODONE HYDROCHLORIDE 100 MG: 100 TABLET ORAL at 20:44

## 2021-05-10 RX ADMIN — INSULIN GLARGINE 35 UNITS: 100 INJECTION, SOLUTION SUBCUTANEOUS at 20:44

## 2021-05-10 NOTE — GROUP NOTE
Group Therapy Note    Date: 5/10/2021    Group Start Time: 1430  Group End Time: 1520  Group Topic: Cognitive Skills    CLEMENTE Diopmoregino, [address]        Group Therapy Note    Attendees: 8/18         Patient's Goal:  To increase interpersonal interaction. Notes:  PT attended and participated in group. Status After Intervention:  Improved    Participation Level:  Active Listener and Interactive    Participation Quality: Appropriate and Attentive      Speech:  normal      Thought Process/Content: Logical      Affective Functioning: Congruent      Mood: euthymic      Level of consciousness:  Alert and Attentive      Response to Learning: Able to verbalize current knowledge/experience, Able to verbalize/acknowledge new learning and Progressing to goal      Endings: None Reported    Modes of Intervention: Education, Exploration, Clarifying, Problem-solving and Reality-testing      Discipline Responsible: Psychoeducational Specialist      Signature:  Jamarcus Austin

## 2021-05-10 NOTE — PROGRESS NOTES
BEHAVIORAL HEALTH FOLLOW-UP NOTE     5/10/2021    Patient was seen and examined in person, Chart reviewed  Patient's case discussed with staff/team    Chief Complaint: Command hallucinations telling him to harm himself    Interim History:   Mr. Lindo was seen for follow-up assessment today. Nursing staff report no recent or overnight behavioral events. He continues to be medication compliant and his interactions with peers and staff have been appropriate. He continues to isolate to his room but has been noted to be in the milieu more in the afternoon. He attends groups sporadically. He reported being in a \"good\" mood today. He is denying suicidal and homicidal ideation. He reports he has not had any auditory hallucinations in 2 days. He maintains an adequate appetite but still struggles with sleep. He was able to get about 4 straight hours last night. He reports showering regularly and is denying any side effects from his medications. He shared with writer that his wife's name is Altagracia Dexter and that they live together. They have 4 children and he was able to list their ages. He reports talking to her frequently on the phone. Patient feels that he is ready to go home. /69   Pulse 65   Temp 98.1 °F (36.7 °C) (Oral)   Resp 14   Ht 6' 1\" (1.854 m)   Wt 235 lb (106.6 kg)   SpO2 98%   BMI 31.00 kg/m²   Appetite:   [x] Normal/Unchanged  [] Increased  [] Decreased      Sleep:       [] Normal/Unchanged  [] Fair       [x] Poor              Energy:    [x] Normal/Unchanged  [] Increased  [] Decreased        Aggression:  [] yes  [x] no    Patient is [x] able  [] unable to CONTRACT FOR SAFETY ON THE UNIT    PAST MEDICAL/PSYCHIATRIC HISTORY:   Past Medical History:   Diagnosis Date    Diabetes mellitus ([facility]     Schizophrenia ([facility]        FAMILY/SOCIAL HISTORY:  History reviewed. No pertinent family history.   Social History     Socioeconomic History    Marital status: Single     Spouse name: Not on file tablet 100 mg, 100 mg, Oral, Nightly PRN, Reji Peraza, APRN - CNP, 100 mg at 05/09/21 2156    acetaminophen (TYLENOL) tablet 650 mg, 650 mg, Oral, Q4H PRN, Tee Felix MD    aluminum & magnesium hydroxide-simethicone (MAALOX) 200-200-20 MG/5ML suspension 30 mL, 30 mL, Oral, Q6H PRN, Tee Felix MD    hydrOXYzine (ATARAX) tablet 50 mg, 50 mg, Oral, TID PRN, Tee Felix MD, 50 mg at 05/09/21 2202    ibuprofen (ADVIL;MOTRIN) tablet 400 mg, 400 mg, Oral, Q6H PRN, Tee Felix MD    polyethylene glycol (GLYCOLAX) packet 17 g, 17 g, Oral, Daily PRN, Tee Felix MD    nicotine polacrilex (NICORETTE) gum 2 mg, 2 mg, Oral, PRN, Tee Felix MD    nicotine (NICODERM CQ) 14 MG/24HR 1 patch, 1 patch, Transdermal, Daily, Tee Felix MD, 1 patch at 05/10/21 0907    glucose (GLUTOSE) 40 % oral gel 15 g, 15 g, Oral, PRN, Farshad Breath, APRN - CNP    dextrose 50 % IV solution, 12.5 g, Intravenous, PRN, [place] Breath, APRN - CNP    glucagon (rDNA) injection 1 mg, 1 mg, Intramuscular, PRN, [place] Breath, APRN - CNP    dextrose 5 % solution, 100 mL/hr, Intravenous, PRN, Farshad Breath, APRN - CNP      Examination:  /69   Pulse 65   Temp 98.1 °F (36.7 °C) (Oral)   Resp 14   Ht 6' 1\" (1.854 m)   Wt 235 lb (106.6 kg)   SpO2 98%   BMI 31.00 kg/m²   Gait - steady  Medication side effects(SE): Denies     Mental Status Examination:    Level of consciousness:  within normal limits   Appearance:  fair grooming and fair hygiene  Behavior/Motor:  no abnormalities noted  Attitude toward examiner:  cooperative, attentive and good eye contact  Speech:  spontaneous, normal rate, normal volume and well articulated   Mood: euthymic  Affect:  mood congruent  Thought processes:  linear, goal directed and coherent   Thought content:  Homicidal ideation - none  Suicidal Ideation:  denies suicidal ideation  Delusions:  no evidence of delusions  Perceptual Disturbance:  denies any perceptual disturbance  Cognition:  oriented to person, place, and time   Concentration intact  Insight fair   Judgement fair     ASSESSMENT:   Patient symptoms are:  [] Well controlled  [x] Improving  [] Worsening  [] No change      Diagnosis:   Principal Problem:    MDD (major depressive disorder), recurrent, severe, with psychosis ([facility]  Active Problems:    DM type 2, uncontrolled, with neuropathy ([facility]    Paranoid schizophrenia ([facility]    Essential hypertension  Resolved Problems:    * No resolved hospital problems. *      LABS:    No results for input(s): WBC, HGB, PLT in the last 72 hours. No results for input(s): NA, K, CL, CO2, BUN, CREATININE, GLUCOSE in the last 72 hours. No results for input(s): BILITOT, ALKPHOS, AST, ALT in the last 72 hours. Lab Results   Component Value Date    BARBSCNU NEGATIVE 05/04/2021    LABBENZ NEGATIVE 05/04/2021    LABMETH NEGATIVE 05/04/2021    PPXUR NOT REPORTED 05/04/2021       Treatment Plan:  Reviewed current Medications with the patient. Risks, benefits, side effects, drug-to-drug interactions and alternatives to treatment were discussed. The patient consented to treatment. Encourage patient to attend group and other milieu activities. Discharge planning discussed with the patient and treatment team.  Follow-up daily while inpatient    PSYCHOTHERAPY/COUNSELING:  [] Therapeutic interview  [x] Supportive  [] CBT  [] Ongoing  [] Other    [x] Patient continues to need, on a daily basis, active treatment furnished directly by or requiring the supervision of inpatient psychiatric personnel      Anticipated Length of stay: 2 to 3 days                                         Isabela Valencia is a 38 y.o. male being evaluated face to face. --CRISTINA Beckwith CNP on 5/10/2021 at 5:53 PM    An electronic signature was used to authenticate this note. **This report has been created using voice recognition software.  It may contain minor errors which are inherent in voice recognition technology. **                                         Psychiatry Attending Attestation     I independently saw and evaluated the patient. I reviewed the Advance Practice Provider's documentation above. Any additional comments or changes to the Advance Practice Provider's documentation are stated below otherwise agree with assessment. Patient feels better than before. Mood and affect are better. Patient reports fleeting suicidal thoughts with no intent or plan. Patient notes that these thoughts are occurring less frequently. Denies any homicidal thoughts, that was explored with the patient. Oriented to time place and person. Recent and remote memory is intact. Patient feels hopeful. Sleep and appetite is good. No side effect from medication reported. Side-effect of medication were discussed with the patient . Patient is responding to current treatment. Discharge soon, if patient continues to show improvement. Case discussed with the staff.      Electronically signed by Carlos Alcala MD on 5/10/21 at 7:38 PM EDT

## 2021-05-10 NOTE — CONSULTS
[facility] Internal Medicine    CONSULTATION / HISTORY AND PHYSICAL EXAMINATION            Date:   5/10/2021  Patient name:  Yan Hackett  Date of admission:  5/4/2021  1:05 PM  MRN:   [id number]  Account:  [de-identified]  YOB: 1985  PCP:    No primary care provider on file. Room:   [address] [place] [address]  Code Status:    Full Code    Physician Requesting Consult: Marlene Dietrich, *    Reason for Consult:  medical management    Chief Complaint:     Chief Complaint   Patient presents with    Suicidal   Uncontrolled diabetes mellitus h  History Obtained From:     Patient medical record nursing staff    History of Present Illness:        5/10/2021    · Blood sugars noted . · BP control good 1.   improving     Recent Labs     05/09/21  1141 05/09/21  1652 05/09/21  2109 05/10/21  0758 05/10/21  1149   POCGLU 206* 327* 388* 139* 263*     BP Readings from Last 3 Encounters:   05/10/21 113/69               Diabetes   Duration more than \3 years  Modifying factors on insulin  and other med  Severity uncontrolled sever  Associated signs and symtoms neuropath   aggravated with sugar diet and better with low sugar diet       HTN  Onset more than 2 years ago  maddie mild to mod  Controlled with current po meds  Not associated with headaches or blurry vision  No chest pain        Past Medical History:     Past Medical History:   Diagnosis Date    [facility] mellitus ([facility]     Schizophrenia ([facility]         Past Surgical History:     History reviewed. No pertinent surgical history. Medications Prior to Admission:     Prior to Admission medications    Medication Sig Start Date End Date Taking?  Authorizing Provider   paliperidone (INVEGA) 6 MG extended release tablet Take 6 mg by mouth 2 times daily   Yes Historical Provider, MD   QUEtiapine (SEROQUEL) 200 MG tablet Take 200 mg by mouth nightly   Yes Historical Provider, MD   hydrOXYzine (VISTARIL) 50 MG capsule Take 50 mg by mouth 3 kg/m²   Temp (24hrs), Av.2 °F (36.8 °C), Min:98.1 °F (36.7 °C), Max:98.3 °F (36.8 °C)    Recent Labs     21  1652 21  2109 05/10/21  0758 05/10/21  1149   POCGLU 327* 388* 139* 263*     No intake or output data in the 24 hours ending 05/10/21 1402    General Appearance:  alert, well appearing, and in no acute distress  Mental status: oriented to person, place, and time with normal affect  Head:  normocephalic, atraumatic. Eye: no icterus, redness, pupils equal and reactive, extraocular eye movements intact, conjunctiva clear  Ear: normal external ear, no discharge, hearing intact  Nose:  no drainage noted  Mouth: mucous membranes moist  Neck: supple, no carotid bruits, thyroid not palpable  Lungs: Bilateral equal air entry, clear to ausculation, no wheezing, rales or rhonchi, normal effort  Cardiovascular: normal rate, regular rhythm, no murmur, gallop, rub. Abdomen: Soft, nontender, nondistended, normal bowel sounds, no hepatomegaly or splenomegaly  Neurologic: There are no new focal motor or sensory deficits, normal muscle tone and bulk, no abnormal sensation, normal speech, cranial nerves II through XII grossly intact  Skin: No gross lesions, rashes, bruising or bleeding on exposed skin area  Extremities:  peripheral pulses palpable, no pedal edema or calf pain with palpation      Investigations:      Laboratory Testing:  Recent Results (from the past 24 hour(s))   POC Glucose Fingerstick    Collection Time: 21  4:52 PM   Result Value Ref Range    POC Glucose 327 (H) 75 - 110 mg/dL   POC Glucose Fingerstick    Collection Time: 21  9:09 PM   Result Value Ref Range    POC Glucose 388 (H) 75 - 110 mg/dL   POC Glucose Fingerstick    Collection Time: 05/10/21  7:58 AM   Result Value Ref Range    POC Glucose 139 (H) 75 - 110 mg/dL   POC Glucose Fingerstick    Collection Time: 05/10/21 11:49 AM   Result Value Ref Range    POC Glucose 263 (H) 75 - 110 mg/dL       Medications:      Allergies: No Known Allergies    Current Meds:   Scheduled Meds:    insulin glargine  33 Units Subcutaneous BID    OLANZapine  10 mg Oral Nightly    nicotine  1 patch Transdermal Daily    insulin lispro  0-12 Units Subcutaneous TID WC    insulin lispro  0-6 Units Subcutaneous Nightly     Continuous Infusions:    dextrose       PRN Meds: traZODone, acetaminophen, aluminum & magnesium hydroxide-simethicone, hydrOXYzine, ibuprofen, polyethylene glycol, nicotine polacrilex, glucose, dextrose, glucagon (rDNA), dextrose        Consultations:   IP CONSULT TO INTERNAL MEDICINE  Assessment :      Primary Problem  MDD (major depressive disorder), recurrent, severe, with psychosis ([facility]    Active Hospital Problems    Diagnosis Date Noted    DM type 2, uncontrolled, with neuropathy ([facility] [E11.40, E11.65] 05/05/2021    Paranoid schizophrenia ([facility] [F20.0] 05/05/2021    MDD (major depressive disorder), recurrent, severe, with psychosis ([facility] [F33.3] 05/05/2021       Plan:     Uncontrolled diabetes mellitus increase Lantus to 33  twice a day  Microscopic hematuria noted us renal no RCC  Obesity class I BMI 31    5/10/21    · bp and blood sugar control improving  2. Optimize regimen            Jose Marcos MD  5/10/2021  2:02 PM    Copy sent to Dr. Fernandez primary care provider on file. Please note that this chart was generated using voice recognition Dragon dictation software. Although every effort was made to ensure the accuracy of this automated transcription, some errors in transcription may have occurred.

## 2021-05-10 NOTE — GROUP NOTE
Group Therapy Note    Date: 5/10/2021    Group Start Time: 1000  Group End Time: 5372  Group Topic: Recreational    [address] [facility], [facility]    Patient refused to attend Recreational Therapy Group at 1000 after encouragement from staff. 1:1 talk time offered.     Signature:  Gopi Pinzon

## 2021-05-10 NOTE — GROUP NOTE
Group Therapy Note    Date: 5/10/2021    Group Start Time: 0900  Group End Time: 0915  Group Topic: Community Meeting    [facility]    [place], [place]        Group Therapy Note    Attendees: 10/20         Pt did not attend [employer] d/t resting in room despite staff invitation to attend. 1:1 talk time offered as alternative to group session, pt declined.

## 2021-05-11 VITALS
SYSTOLIC BLOOD PRESSURE: 105 MMHG | TEMPERATURE: 97.7 F | RESPIRATION RATE: 14 BRPM | HEART RATE: 87 BPM | OXYGEN SATURATION: 98 % | DIASTOLIC BLOOD PRESSURE: 63 MMHG | BODY MASS INDEX: 31.14 KG/M2 | HEIGHT: 73 IN | WEIGHT: 235 LBS

## 2021-05-11 LAB — GLUCOSE BLD-MCNC: 181 MG/DL (ref 75–110)

## 2021-05-11 PROCEDURE — 82947 ASSAY GLUCOSE BLOOD QUANT: CPT

## 2021-05-11 PROCEDURE — 99239 HOSP IP/OBS DSCHRG MGMT >30: CPT | Performed by: PSYCHIATRY & NEUROLOGY

## 2021-05-11 PROCEDURE — 6370000000 HC RX 637 (ALT 250 FOR IP): Performed by: INTERNAL MEDICINE

## 2021-05-11 PROCEDURE — 99231 SBSQ HOSP IP/OBS SF/LOW 25: CPT | Performed by: INTERNAL MEDICINE

## 2021-05-11 RX ORDER — HYDROXYZINE 50 MG/1
50 TABLET, FILM COATED ORAL 3 TIMES DAILY PRN
Qty: 30 TABLET | Refills: 0 | Status: SHIPPED | OUTPATIENT
Start: 2021-05-11 | End: 2021-05-21

## 2021-05-11 RX ORDER — TRAZODONE HYDROCHLORIDE 100 MG/1
100 TABLET ORAL NIGHTLY PRN
Qty: 30 TABLET | Refills: 0 | Status: SHIPPED | OUTPATIENT
Start: 2021-05-11

## 2021-05-11 RX ORDER — OLANZAPINE 10 MG/1
10 TABLET ORAL NIGHTLY
Qty: 30 TABLET | Refills: 0 | Status: SHIPPED | OUTPATIENT
Start: 2021-05-11

## 2021-05-11 RX ORDER — INSULIN GLARGINE 100 [IU]/ML
38 INJECTION, SOLUTION SUBCUTANEOUS 2 TIMES DAILY
Status: DISCONTINUED | OUTPATIENT
Start: 2021-05-11 | End: 2021-05-11 | Stop reason: HOSPADM

## 2021-05-11 RX ADMIN — INSULIN LISPRO 1 UNITS: 100 INJECTION, SOLUTION INTRAVENOUS; SUBCUTANEOUS at 08:32

## 2021-05-11 RX ADMIN — INSULIN GLARGINE 35 UNITS: 100 INJECTION, SOLUTION SUBCUTANEOUS at 08:32

## 2021-05-11 NOTE — GROUP NOTE
Group Therapy Note    Date: 5/11/2021    Group Start Time: 1330  Group End Time: 7155  Group Topic: Recreational    [address] [facility]    Patient refused to attend Recreational Therapy Group at 1330 after encouragement from staff. 1:1 talk time offered.     Signature:  Marco Ardon

## 2021-05-11 NOTE — BH NOTE
Patient given tobacco quitline number 45436528204 at this time, refusing to call at this time, states \" I just dont want to quit now\"- patient given information as to the dangers of long term tobacco use. Continue to reinforce the importance of tobacco cessation.

## 2021-05-11 NOTE — GROUP NOTE
Group Therapy Note    Date: 5/11/2021    Group Start Time: 1100  Group End Time: 1130  Group Topic: Psychotherapy    [facility] RANJANA Guzman, LSW        Group Therapy Note    Attendees: 2/9         Patient was offered group therapy today but declined to participate despite encouragement from staff. 1:1 was offered.     Signature:  RANJANA Carrasquillo, VANESSA

## 2021-05-11 NOTE — BH NOTE
.  [address] [facility]  Discharge Note    Pt discharged with followings belongings:   Jewelry: Necklace, Watch  Clothing: Footwear, Pants, Shirt, Socks   Valuables sent home with Pt. Valuables retrieved from safe, Security envelope number:  [id number] and returned to patient. Patient education on aftercare instructions: yes  Information faxed to [facility] outpatient services by RN Patient verbalize understanding of AVS:  yes. Status EXAM upon discharge:  Status and Exam  Normal: No  Facial Expression: Flat  Affect: Blunt  Level of Consciousness: Alert  Mood:Normal: No  Mood: Depressed, Anxious  Motor Activity:Normal: No  Motor Activity: Decreased  Interview Behavior: Cooperative  Preception: [place] to Person, [place] Ditto to Time, [place] to Place, [place] to Situation  Attention:Normal: No  Attention: Distractible  Thought Processes: Circumstantial  Thought Content:Normal: No  Thought Content: Poverty of Content, Preoccupations  Hallucinations: None  Delusions: No  Memory:Normal: Yes  Memory: Poor Remote  Insight and Judgment: No  Insight and Judgment: Poor Judgment  Present Suicidal Ideation: No  Present Homicidal Ideation: No      Metabolic Screening:    Lab Results   Component Value Date    LABA1C 11.0 (H) 05/04/2021       No results found for: CHOL  No results found for: TRIG  No results found for: HDL  No components found for: LDLCAL  No results found for: Liya Gavin RN     Pt was discharged in stable condition to a private residence. Pt was picked up by [employer] taxi. Pt was educated on aftercare appointments and medication. Belongings were collected and returned to pt.

## 2021-05-11 NOTE — GROUP NOTE
Group Therapy Note    Date: 5/11/2021    Group Start Time: 0900  Group End Time: 0915  Group Topic: Community Meeting    [facility]    [place], [place]        Group Therapy Note    Attendees: 8/18         Pt did not attend [employer] d/t resting in room despite staff invitation to attend. 1:1 talk time offered as alternative to group session, pt declined.

## 2021-05-11 NOTE — CONSULTS
[facility] Internal Medicine    CONSULTATION / HISTORY AND PHYSICAL EXAMINATION            Date:   5/11/2021  Patient name:  Carol May  Date of admission:  5/4/2021  1:05 PM  MRN:   [id number]  Account:  [de-identified]  YOB: 1985  PCP:    No primary care provider on file. Room:   [address] [place] [address]  Code Status:    Full Code    Physician Requesting Consult: Mag Capellan, *    Reason for Consult:  medical management    Chief Complaint:     Chief Complaint   Patient presents with    Suicidal   Uncontrolled diabetes mellitus h  History Obtained From:     Patient medical record nursing staff    History of Present Illness:        5/11/2021    · Blood sugars noted . · BP control good 1.   improving     Recent Labs     05/10/21  0758 05/10/21  1149 05/10/21  1714 05/10/21  1953 05/11/21  0818   POCGLU 139* 263* 269* 247* 181*     BP Readings from Last 3 Encounters:   05/11/21 105/63               Diabetes   Duration more than \3 years  Modifying factors on insulin  and other med  Severity uncontrolled sever  Associated signs and symtoms neuropath   aggravated with sugar diet and better with low sugar diet       HTN  Onset more than 2 years ago  maddie mild to mod  Controlled with current po meds  Not associated with headaches or blurry vision  No chest pain        Past Medical History:     Past Medical History:   Diagnosis Date    Diabetes mellitus ([facility]     Schizophrenia ([facility]         Past Surgical History:     History reviewed. No pertinent surgical history. Medications Prior to Admission:     Prior to Admission medications    Medication Sig Start Date End Date Taking?  Authorizing Provider   hydrOXYzine (ATARAX) 50 MG tablet Take 1 tablet by mouth 3 times daily as needed for Anxiety 5/11/21 5/21/21 Yes Mag Capellan MD   traZODone (DESYREL) 100 MG tablet Take 1 tablet by mouth nightly as needed for Sleep 5/11/21  Yes Mag Capellan MD OLANZapine (ZYPREXA) 10 MG tablet Take 1 tablet by mouth nightly 21  Yes Margaret Luque MD   insulin lispro, 1 Unit Dial, (HUMALOG KWIKPEN) 100 UNIT/ML SOPN Inject 40 Units into the skin 3 times daily (before meals)    Yes Historical Provider, MD   insulin glargine (LANTUS SOLOSTAR) 100 UNIT/ML injection pen Inject 55 Units into the skin nightly   Yes Historical Provider, MD        Allergies:     Benztropine    Social History:     Tobacco:    reports that he has never smoked. He does not have any smokeless tobacco history on file. Alcohol:      reports previous alcohol use. Drug Use:  reports previous drug use. Family History:     History reviewed. No pertinent family history. Review of Systems:     Positive and Negative as described in HPI. CONSTITUTIONAL:  negative for fevers, chills, sweats, fatigue, weight loss  HEENT:  negative for vision, hearing changes, runny nose, throat pain  RESPIRATORY:  negative for shortness of breath, cough, congestion, wheezing. CARDIOVASCULAR:  negative for chest pain, palpitations.   GASTROINTESTINAL:  negative for nausea, vomiting, diarrhea, constipation, change in bowel habits, abdominal pain   GENITOURINARY:  negative for difficulty of urination, burning with urination, frequency   INTEGUMENT:  negative for rash, skin lesions, easy bruising   HEMATOLOGIC/LYMPHATIC:  negative for swelling/edema   ALLERGIC/IMMUNOLOGIC:  negative for urticaria , itching  ENDOCRINE:  negative increase in drinking, increase in urination, hot or cold intolerance  MUSCULOSKELETAL:  negative joint pains, muscle aches, swelling of joints  NEUROLOGICAL:  negative for headaches, dizziness, lightheadedness, numbness, pain, tingling extremities      Physical Exam:     /63   Pulse 87   Temp 97.7 °F (36.5 °C) (Oral)   Resp 14   Ht 6' 1\" (1.854 m)   Wt 235 lb (106.6 kg)   SpO2 98%   BMI 31.00 kg/m²   Temp (24hrs), Av °F (36.7 °C), Min:97.7 °F (36.5 °C), Max:98.3 °F Meds:   Scheduled Meds:    insulin lispro  0-6 Units Subcutaneous TID WC    insulin glargine  35 Units Subcutaneous BID    OLANZapine  10 mg Oral Nightly    nicotine  1 patch Transdermal Daily     Continuous Infusions:    dextrose       PRN Meds: traZODone, acetaminophen, aluminum & magnesium hydroxide-simethicone, hydrOXYzine, ibuprofen, polyethylene glycol, nicotine polacrilex, glucose, dextrose, glucagon (rDNA), dextrose        Consultations:   IP CONSULT TO INTERNAL MEDICINE  Assessment :      Primary Problem  MDD (major depressive disorder), recurrent, severe, with psychosis ([facility]    Active Hospital Problems    Diagnosis Date Noted    Essential hypertension [I10]     DM type 2, uncontrolled, with neuropathy ([facility] [E11.40, E11.65] 05/05/2021    Paranoid schizophrenia ([facility] [F20.0] 05/05/2021    MDD (major depressive disorder), recurrent, severe, with psychosis ([facility] [F33.3] 05/05/2021       Plan:     Uncontrolled diabetes mellitus increase Lantus to 33  twice a day  Microscopic hematuria noted us renal no RCC  Obesity class I BMI 31    5/10/21    · bp and blood sugar control improving  2. Optimize regimen          5/11/21    Blood sugars improved . Simplify regimen   Stop sliding scale   BP ok 3.   lantus bid dose increased     Recent Labs     05/10/21  0758 05/10/21  1149 05/10/21  1714 05/10/21  1953 05/11/21  0818   POCGLU 139* 263* 269* 247* 181*     BP Readings from Last 3 Encounters:   05/11/21 105/63           Elvira Mansfield MD  5/11/2021  11:08 AM    Copy sent to Dr. Jaclyn Tejdaa primary care provider on file. Please note that this chart was generated using voice recognition Dragon dictation software. Although every effort was made to ensure the accuracy of this automated transcription, some errors in transcription may have occurred.

## 2021-05-11 NOTE — PLAN OF CARE
Problem: Altered Mood, Depressive Behavior:  Goal: Able to verbalize and/or display a decrease in depressive symptoms  Description: Able to verbalize and/or display a decrease in depressive symptoms  5/10/2021 2153 by Sindhu Clark LPN  Outcome: Ongoing     Problem: Altered Mood, Depressive Behavior:  Goal: Ability to disclose and discuss suicidal ideas will improve  Description: Ability to disclose and discuss suicidal ideas will improve  5/10/2021 2153 by Sindhu Clark LPN  Outcome: Ongoing     Problem: Altered Mood, Manic Behavior:  Goal: Able to verbalize decrease in frequency and intensity of racing thoughts  Description: Able to verbalize decrease in frequency and intensity of racing thoughts  5/10/2021 2153 by Sindhu Clark LPN  Outcome: Ongoing     Problem: Tobacco Use:  Goal: Inpatient tobacco use cessation counseling participation  Description: Inpatient tobacco use cessation counseling participation  Outcome: Ongoing

## 2021-05-12 NOTE — DISCHARGE SUMMARY
Provider Discharge Summary     Patient ID:  Monica Steinberg  [id number]  67 y.o.  1985    Admit date: 5/4/2021    Discharge date and time: 5/11/2021  9:47 PM     Admitting Physician: Tee Felix MD     Discharge Physician: Tee Felix MD    Admission Diagnoses: H/O major depression [Z86.59]    Discharge Diagnoses:      MDD (major depressive disorder), recurrent, severe, with psychosis ([facility]     Patient Active Problem List   Diagnosis Code    H/O major depression Z80.58    DM type 2, uncontrolled, with neuropathy ([facility] E11.40, E11.65    [facility]an[facility] F20.0    MDD (major depressive disorder), recurrent, severe, with psychosis ([facility] F33.3    Essential hypertension I10        Admission Condition: poor    Discharged Condition: stable    Indication for Admission: threat to self    History of Present Illnes (present tense wording is of findings from admission exam and are not necessarily indicative of current findings):   Monica Steinberg is a 38 y.o. male with significant past medical history of diabetes, hypertension, schizophrenia who presented to the ED with a chief complaint of suicidal ideations. He reports auditory hallucinations that are command in nature telling him to harm himself. The voices do tell him to kill himself. He also reports that the voices cause paranoia, telling him that people are coming to kill him. He reports that the auditory hallucinations are only present when he is depressed, which has been for the past 8 years. He states that they will get better, and then get worse. They have been continuing to get worse since February of this year. He also reports that he occasionally has visual hallucinations, but they are not present today. He states that he sees \"all kinds of things. \"  He reports that he will see both people and shadows. He was found by police running into traffic.   He reports that he has been off of all of his medications, both psychiatric and physical health medications. He reports that he has been off of his psychotropic medications for \"a while\" and he sees a psychiatric provider in [place].     He reports depressive symptoms of a low mood for greater than 2 weeks, poor sleep, anhedonia, feelings of guilt and worthlessness, poor energy, poor concentration, loss of appetite, psychomotor retardation, feeling hopeless and helpless, suicidal ideation with plan to run into traffic. He reported that he is having a manic episode \"right now\" but this is not evident. Manic symptoms include distractibility, irritability, rapid thoughts, needing less sleep. His mood is very poor. He reports auditory and visual hallucinations, with auditory hallucinations being command in nature and present currently. He also reports present paranoid delusions. He reported panic attacks with the most recent episode being \"right now\" but again this is not evident. When panic was explained to the patient he stated that he had an episode today. Panic symptoms include trembling, palpitations, sweating, fear of dying or going crazy, anticipatory anxiety. Generalized anxiety symptoms include excess worry, restlessness, edginess, easily fatigued, poor sleep, poor concentration. He denies any phobias. He denies any eating disorders. He denies any intrusive and persistent thoughts and OCD tendencies. He denied any personality disorder traits. He reported a traumatic event of losing his mother when he was 4years old due to cancer. He stated that he reexperiences the event, has dreams of flashbacks, avoidance behavior, and startles easily. Hospital Course:   Upon admission, Rachel Cuevsa was provided a safe secure environment, introduced to unit milieu. Patient participated in groups and individual therapies. Meds were adjusted as noted below. After few days of hospital care, patient began to feel improvement. Depression lifted, thoughts to harm self ceased.   Sleep improved, appetite was good. On morning rounds 5/11/2021, Carol May endorses feeling ready for discharge. Patient denies suicidal or homicidal ideations, denies hallucinations or delusions. Denies SE's from meds. It was decided that maximum benefit from hospital care had been achieved and patient can be discharged. Consults:   none    Significant Diagnostic Studies: Routine labs and diagnostics    Treatments: Psychotropic medications, therapy with group, milieu, and 1:1 with nurses, social workers, PAGABBIE/CNP, and Attending physician.       Discharge Medications:  Discharge Medication List as of 5/11/2021 12:59 PM      START taking these medications    Details   hydrOXYzine (ATARAX) 50 MG tablet Take 1 tablet by mouth 3 times daily as needed for Anxiety, Disp-30 tablet, R-0Normal      traZODone (DESYREL) 100 MG tablet Take 1 tablet by mouth nightly as needed for Sleep, Disp-30 tablet, R-0Normal      OLANZapine (ZYPREXA) 10 MG tablet Take 1 tablet by mouth nightly, Disp-30 tablet, R-0Normal         CONTINUE these medications which have NOT CHANGED    Details   insulin lispro, 1 Unit Dial, (HUMALOG KWIKPEN) 100 UNIT/ML SOPN Inject 40 Units into the skin 3 times daily (before meals) Historical Med      insulin glargine (LANTUS SOLOSTAR) 100 UNIT/ML injection pen Inject 55 Units into the skin nightlyHistorical Med         STOP taking these medications       paliperidone (INVEGA) 6 MG extended release tablet Comments:   Reason for Stopping:         QUEtiapine (SEROQUEL) 200 MG tablet Comments:   Reason for Stopping:         hydrOXYzine (VISTARIL) 50 MG capsule Comments:   Reason for Stopping:         pregabalin (LYRICA) 100 MG capsule Comments:   Reason for Stopping:                Core Measures statement:   Not applicable    Discharge Exam:  Level of consciousness:  Within normal limits  Appearance: Street clothes, seated, with good grooming  Behavior/Motor: No abnormalities noted  Attitude toward examiner: Cooperative, attentive, good eye contact  Speech:  spontaneous, normal rate, normal volume and well articulated  Mood:  euthymic  Affect:  Full range  Thought processes:  linear, goal directed and coherent  Thought content:  denies homicidal ideation  Suicidal Ideation:  denies suicidal ideation  Delusions:  no evidence of delusions  Perceptual Disturbance:  denies any perceptual disturbance  Cognition:  Intact  Memory: age appropriate  Insight & Judgement: fair  Medication side effects: denies     Disposition: home    Patient Instructions: Activity: activity as tolerated  1. Patient instructed to take medications regularly and follow up with outpatient appointments. Follow-up as scheduled with PCP       Signed:    Electronically signed by Alena Simpson MD on 5/11/21 at 9:47 PM EDT    Time Spent on discharge is more than 35 minutes in the examination, evaluation, counseling and review of medications and discharge plan.

## 2022-07-27 NOTE — PLAN OF CARE
"Spoke to pt to let him know that since the aspirin is the only \"blood thinner\" type of medication he is taking it should be okay, but that he could discontinue for 3 days prior just to be on the safe side; He verbalized understanding and will call back with any further questions or concerns.    JOSE Venegas  " Problem: Altered Mood, Depressive Behavior:  Goal: Able to verbalize and/or display a decrease in depressive symptoms  Description: Able to verbalize and/or display a decrease in depressive symptoms  Outcome: Ongoing   Patient reports depression . Patient encouraged to attend groups to work on coping skills. Problem: Altered Mood, Depressive Behavior:  Goal: Ability to disclose and discuss suicidal ideas will improve  Description: Ability to disclose and discuss suicidal ideas will improve  Outcome: Ongoing   Patient denies suicidal ideas. No sign of self harm noted. Problem: Altered Mood, Manic Behavior:  Goal: Able to verbalize decrease in frequency and intensity of racing thoughts  Description: Able to verbalize decrease in frequency and intensity of racing thoughts  Outcome: Ongoing   Patient reports racing thoughts are decreasing. Will continue to monitor for safety every 15 minutes and provide support as needed.
